# Patient Record
Sex: MALE | Race: OTHER | Employment: OTHER | URBAN - METROPOLITAN AREA
[De-identification: names, ages, dates, MRNs, and addresses within clinical notes are randomized per-mention and may not be internally consistent; named-entity substitution may affect disease eponyms.]

---

## 2021-12-10 ENCOUNTER — OFFICE VISIT (OUTPATIENT)
Dept: ORTHOPEDIC SURGERY | Age: 51
End: 2021-12-10
Payer: OTHER GOVERNMENT

## 2021-12-10 VITALS
BODY MASS INDEX: 40.02 KG/M2 | HEIGHT: 66 IN | TEMPERATURE: 97.8 F | OXYGEN SATURATION: 97 % | WEIGHT: 249 LBS | HEART RATE: 61 BPM

## 2021-12-10 DIAGNOSIS — M25.512 CHRONIC PAIN OF BOTH SHOULDERS: Primary | ICD-10-CM

## 2021-12-10 DIAGNOSIS — M75.122 NONTRAUMATIC COMPLETE TEAR OF LEFT ROTATOR CUFF: ICD-10-CM

## 2021-12-10 DIAGNOSIS — M25.511 CHRONIC PAIN OF BOTH SHOULDERS: Primary | ICD-10-CM

## 2021-12-10 DIAGNOSIS — G89.29 CHRONIC PAIN OF BOTH SHOULDERS: Primary | ICD-10-CM

## 2021-12-10 DIAGNOSIS — M75.111 NONTRAUMATIC INCOMPLETE TEAR OF RIGHT ROTATOR CUFF: ICD-10-CM

## 2021-12-10 PROCEDURE — 73030 X-RAY EXAM OF SHOULDER: CPT | Performed by: ORTHOPAEDIC SURGERY

## 2021-12-10 PROCEDURE — 99204 OFFICE O/P NEW MOD 45 MIN: CPT | Performed by: ORTHOPAEDIC SURGERY

## 2021-12-10 RX ORDER — ZOLPIDEM TARTRATE 10 MG/1
10 TABLET ORAL
COMMUNITY
Start: 2021-10-12

## 2021-12-10 RX ORDER — CITALOPRAM 10 MG/1
10 TABLET ORAL DAILY
COMMUNITY
Start: 2021-10-12 | End: 2022-03-23

## 2021-12-10 RX ORDER — OMEPRAZOLE 20 MG/1
20 TABLET, DELAYED RELEASE ORAL DAILY
COMMUNITY
Start: 2021-11-04 | End: 2022-03-23

## 2021-12-10 NOTE — PROGRESS NOTES
Patient: Naseem Pino                MRN: [id number]       SSN: [id number]  YOB: 1970        AGE: 45 y.o. SEX: male  Body mass index is 40.19 kg/m². PCP: Unknown, Provider, MD  12/10/21    CHIEF COMPLAINT: Bilateral shoulder pain 10/10 at times    HPI: Naseem Pino is a 45 y.o. male patient who presents to the office today with several years, up to 5, of bilateral shoulder pain. The left is worse than the right. He denies any specific injury or trauma in the past.  He states that his pain has been worsening over the past several years. He has pain with use of the arms especially away from the body and over shoulder height. He also has pain at night. In the past he has had physical therapy without resolution of symptoms. He did have MRIs in 2019 of both shoulders but nothing more recently. History reviewed. No pertinent past medical history. No family history on file. Current Outpatient Medications   Medication Sig Dispense Refill    omeprazole (PRILOSEC OTC) 20 mg tablet Take 20 mg by mouth daily.  citalopram (CELEXA) 10 mg tablet Take 10 mg by mouth daily.  zolpidem (AMBIEN) 10 mg tablet Take 10 mg by mouth.          No Known Allergies    Past Surgical History:   Procedure Laterality Date    HX ACL RECONSTRUCTION      HX HERNIA REPAIR      HX KNEE ARTHROSCOPY Right     HX ORCHIECTOMY  2006       Social History     Socioeconomic History    Marital status: UNKNOWN     Spouse name: Not on file    Number of children: Not on file    Years of education: Not on file    Highest education level: Not on file   Occupational History    Not on file   Tobacco Use    Smoking status: Never Smoker    Smokeless tobacco: Never Used   Substance and Sexual Activity    Alcohol use: Not on file    Drug use: Not on file    Sexual activity: Not on file   Other Topics Concern    Not on file   Social History Narrative    Not on file     Social Determinants of Health Financial Resource Strain:     Difficulty of Paying Living Expenses: Not on file   Food Insecurity:     Worried About Running Out of Food in the Last Year: Not on file    Kerline of Food in the Last Year: Not on file   Transportation Needs:     Lack of Transportation (Medical): Not on file    Lack of Transportation (Non-Medical): Not on file   Physical Activity:     Days of Exercise per Week: Not on file    Minutes of Exercise per Session: Not on file   Stress:     Feeling of Stress : Not on file   Social Connections:     Frequency of Communication with Friends and Family: Not on file    Frequency of Social Gatherings with Friends and Family: Not on file    Attends Bahai Services: Not on file    Active Member of [address] [place] [address] or Organizations: Not on file    Attends Club or Organization Meetings: Not on file    Marital Status: Not on file   Intimate Partner Violence:     Fear of Current or Ex-Partner: Not on file    Emotionally Abused: Not on file    Physically Abused: Not on file    Sexually Abused: Not on file   Housing Stability:     Unable to Pay for Housing in the Last Year: Not on file    Number of [place] in the Last Year: Not on file    Unstable Housing in the Last Year: Not on file       REVIEW OF SYSTEMS:      CON: negative for recent weight loss/gain, fever, or chills  EYE: negative for double or blurry vision  ENT: negative for hoarseness  RS:   negative for cough, URI, SOB  CV:  negative for chest pain, palpitations  GI:    negative for blood in stool, nausea/vomiting  :  negative for blood in urine  MS: As per HPI  Other systems reviewed and noted below. PHYSICAL EXAMINATION:  Visit Vitals  Pulse 61   Temp 97.8 °F (36.6 °C) (Temporal)   Ht 5' 6\" (1.676 m)   Wt 249 lb (112.9 kg)   SpO2 97%   BMI 40.19 kg/m²     Body mass index is 40.19 kg/m². GENERAL: Alert and oriented x3, in no acute distress, well-developed, well-nourished. HEENT: Normocephalic, atraumatic.     RESP: Non labored breathing with equal chest rise on inspiration. CV: Well perfused extremities. No cyanosis or clubbing noted. ABDOMEN: Soft, non-tender, non-distended. Shoulder Examination     R   L  ROM   FF  Full   Full  ER  Full   Full   IR  Full   Full  Rotator Cuff Pain   Supra  +   +   Infra  -   +   Subscap +   +  Crepitus  -   -  Effusion  -   -  Warmth  -   -   Erythema  -   -  Instability  -   -  AC Joint TTP  -   -  Clavicle   Deformity -   -   TTP  -   -  Proximal Humerus   Deformity -   -   TTP  -   -  Deltoid Strength 5   5  Biceps Strength 5   5  Biceps Deformity -   -  Biceps Groove Pain -   +  Impingement Sign -   -       IMAGING:  X-rays 4 views of the right shoulder were taken the office today which show a slightly elevated humeral head but no other significant shoulder findings. X-rays 4 views of left shoulder which also taken the office today which show sclerosis of the greater tuberosity but no other acute or chronic bony abnormality findings. An MRI of the left shoulder from 2019 was reviewed which shows a high-grade partial-thickness articular sided tear of the supraspinatus rotator cuff tendon with possible full-thickness component. An MRI of the right shoulder was reviewed from 2019 which does not show any full-thickness rotator cuff tearing. ASSESSMENT & PLAN  Diagnosis: Bilateral rotator cuff pain    Jessica Alegria has bilateral rotator cuff pain that is symptomatic and has been present for almost up to 5 years. Due to the chronicity of his symptoms as well as the failure of conservative management along with the MRI of his left shoulder in 2019 that showed a possible full-thickness rotator cuff tear with the risk of progression at approximately 50% I recommended new MRIs of both shoulders today to further evaluate the rotator cuff as I think that is likely the source of his pain. I will see him back after the MRIs are completed.       Electronically signed by: Candace Sylvester MD    Note: This note was completed using voice recognition software.   Any typographical/name errors or mistakes are unintentional.

## 2022-01-02 ENCOUNTER — HOSPITAL ENCOUNTER (OUTPATIENT)
Age: 52
Discharge: HOME | End: 2022-01-02
Attending: ORTHOPAEDIC SURGERY
Payer: OTHER GOVERNMENT

## 2022-01-02 DIAGNOSIS — M75.122 NONTRAUMATIC COMPLETE TEAR OF LEFT ROTATOR CUFF: ICD-10-CM

## 2022-01-02 DIAGNOSIS — M75.111 NONTRAUMATIC INCOMPLETE TEAR OF RIGHT ROTATOR CUFF: ICD-10-CM

## 2022-01-02 PROCEDURE — 73221 MRI JOINT UPR EXTREM W/O DYE: CPT

## 2022-01-05 ENCOUNTER — OFFICE VISIT (OUTPATIENT)
Dept: ORTHOPEDIC SURGERY | Age: 52
End: 2022-01-05
Payer: OTHER GOVERNMENT

## 2022-01-05 VITALS — BODY MASS INDEX: 40.19 KG/M2 | HEIGHT: 66 IN | TEMPERATURE: 97.6 F | RESPIRATION RATE: 16 BRPM

## 2022-01-05 DIAGNOSIS — M75.122 NONTRAUMATIC COMPLETE TEAR OF LEFT ROTATOR CUFF: Primary | ICD-10-CM

## 2022-01-05 DIAGNOSIS — M75.111 NONTRAUMATIC INCOMPLETE TEAR OF RIGHT ROTATOR CUFF: ICD-10-CM

## 2022-01-05 DIAGNOSIS — S46.102A INJURY OF TENDON OF LONG HEAD OF BICEPS, LEFT, INITIAL ENCOUNTER: ICD-10-CM

## 2022-01-05 PROCEDURE — 99214 OFFICE O/P EST MOD 30 MIN: CPT | Performed by: ORTHOPAEDIC SURGERY

## 2022-01-05 NOTE — PROGRESS NOTES
Patient: Jesse Burnham                MRN: [id number]       SSN: [id number]  YOB: 1970        AGE: 45 y.o. SEX: male  Body mass index is 40.19 kg/m². PCP: Martínez Hernandez MD  01/05/22    Chief Complaint: Bilateral shoulder follow up    Pain 7/10    HPI: Jesse Burnham is a 45 y.o. male patient who returns to the office today for his bilateral shoulders. He continues to have left shoulder pain as well as right shoulder pain and popping. The left shoulder pain is worse. He has had MRIs of both the left and right shoulder. History reviewed. No pertinent past medical history. History reviewed. No pertinent family history. Current Outpatient Medications   Medication Sig Dispense Refill    omeprazole (PRILOSEC OTC) 20 mg tablet Take 20 mg by mouth daily.  citalopram (CELEXA) 10 mg tablet Take 10 mg by mouth daily.  zolpidem (AMBIEN) 10 mg tablet Take 10 mg by mouth.          No Known Allergies    Past Surgical History:   Procedure Laterality Date    HX ACL RECONSTRUCTION      HX HERNIA REPAIR      HX KNEE ARTHROSCOPY Right     HX ORCHIECTOMY  2006       Social History     Socioeconomic History    Marital status:      Spouse name: Not on file    Number of children: Not on file    Years of education: Not on file    Highest education level: Not on file   Occupational History    Not on file   Tobacco Use    Smoking status: Never Smoker    Smokeless tobacco: Never Used   Substance and Sexual Activity    Alcohol use: Not on file    Drug use: Not on file    Sexual activity: Not on file   Other Topics Concern    Not on file   Social History Narrative    Not on file     Social Determinants of Health     Financial Resource Strain:     Difficulty of Paying Living Expenses: Not on file   Food Insecurity:     Worried About Running Out of Food in the Last Year: Not on file    Kerline of Food in the Last Year: Not on file   Transportation Needs:     Lack of Transportation (Medical): Not on file    Lack of Transportation (Non-Medical): Not on file   Physical Activity:     Days of Exercise per Week: Not on file    Minutes of Exercise per Session: Not on file   Stress:     Feeling of Stress : Not on file   Social Connections:     Frequency of Communication with Friends and Family: Not on file    Frequency of Social Gatherings with Friends and Family: Not on file    Attends Nondenominational Services: Not on file    Active Member of [address] [place] [address] or Organizations: Not on file    Attends Club or Organization Meetings: Not on file    Marital Status: Not on file   Intimate Partner Violence:     Fear of Current or Ex-Partner: Not on file    Emotionally Abused: Not on file    Physically Abused: Not on file    Sexually Abused: Not on file   Housing Stability:     Unable to Pay for Housing in the Last Year: Not on file    Number of [place] in the Last Year: Not on file    Unstable Housing in the Last Year: Not on file       REVIEW OF SYSTEMS:      No changes from previous review of systems unless noted. PHYSICAL EXAMINATION:  Visit Vitals  Temp 97.6 °F (36.4 °C) (Temporal)   Resp 16   Ht 5' 6\" (1.676 m)   BMI 40.19 kg/m²     Body mass index is 40.19 kg/m². GENERAL: Alert and oriented x3, in no acute distress. HEENT: Normocephalic, atraumatic. RESP: Non labored breathing. SKIN: No rashes or lesions noted. Shoulder Examination     R   L  ROM   FF  Full   Full  ER  Full   Full   IR  Full   Full  Rotator Cuff Pain   Supra  +   +   Infra  -   +   Subscap -   -  Crepitus  -   -  Effusion  -   -  Warmth  -   -   Erythema  -   -  Instability  -   -  AC Joint TTP  -   -  Clavicle   Deformity -   -   TTP  -   -  Proximal Humerus   Deformity -   -   TTP  -   -  Deltoid Strength 5   5  Biceps Strength 5   5  Biceps Deformity -   -  Biceps Groove Pain -   -  Impingement Sign -   -       IMAGING:  An MRI of the right shoulder is reviewed in the office today.   The MRI does not show any full-thickness rotator cuff tears. An MRI of the left shoulder was reviewed which shows a left supraspinatus full-thickness minimally retracted rotator cuff tear. ASSESSMENT & PLAN  Diagnosis: Right shoulder rotator cuff pain, left shoulder full-thickness rotator cuff tear    I recommended for Roby's left shoulder that he undergo surgery in the form of arthroscopic rotator cuff repair after discussing the risk benefits and alternatives to surgery. 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 For his right shoulder I recommended physical therapy at this time but we will hold off on that and likely get him started in both shoulder physical therapy postoperatively. Electronically signed by: Abad Carey MD    Note: This note was completed using voice recognition software.   Any typographical/name errors or mistakes are unintentional.

## 2022-03-09 DIAGNOSIS — S46.102A INJURY OF TENDON OF LONG HEAD OF BICEPS, LEFT, INITIAL ENCOUNTER: ICD-10-CM

## 2022-03-09 DIAGNOSIS — M75.122 NONTRAUMATIC COMPLETE TEAR OF LEFT ROTATOR CUFF: Primary | ICD-10-CM

## 2022-03-25 ENCOUNTER — OFFICE VISIT (OUTPATIENT)
Dept: ORTHOPEDIC SURGERY | Age: 52
End: 2022-03-25
Payer: OTHER GOVERNMENT

## 2022-03-25 VITALS
WEIGHT: 253 LBS | TEMPERATURE: 97.1 F | OXYGEN SATURATION: 94 % | SYSTOLIC BLOOD PRESSURE: 125 MMHG | HEIGHT: 66 IN | RESPIRATION RATE: 16 BRPM | DIASTOLIC BLOOD PRESSURE: 80 MMHG | BODY MASS INDEX: 40.66 KG/M2 | HEART RATE: 90 BPM

## 2022-03-25 DIAGNOSIS — R05.9 COUGH IN ADULT PATIENT: Primary | ICD-10-CM

## 2022-03-25 PROCEDURE — 99212 OFFICE O/P EST SF 10 MIN: CPT | Performed by: PHYSICIAN ASSISTANT

## 2022-03-25 NOTE — PROGRESS NOTES
Angie Puri returns to the office for history and physical in preparation for his left shoulder rotator cuff repair. Last evening he developed a fever with cough and body aches. He did have Covid back in January 2022. He did perform a Covid test which was negative by report last evening. He is scheduled for surgery on Tuesday upcoming for the left shoulder arthroscopic rotator cuff repair with biceps tenodesis. At this point based on his presentation he is good to go home today get rest increase fluids and if feeling poorly tomorrow Saturday the 26th be seen by [facility] for proper medical assessment and diagnosis. Should he develop fever over 100.5 he should treat accordingly with Tylenol and/or Motrin per 's recommended dosing. Any chest pain or shortness of breath he was recommended to the emergency room. He will phone our office on Monday, 28 March to discuss plan of care. He is tentatively scheduled still for surgery on 29 March 2022.

## 2022-05-10 ENCOUNTER — OFFICE VISIT (OUTPATIENT)
Dept: ORTHOPEDIC SURGERY | Age: 52
End: 2022-05-10

## 2022-05-10 VITALS
WEIGHT: 246.8 LBS | HEART RATE: 81 BPM | TEMPERATURE: 97.8 F | DIASTOLIC BLOOD PRESSURE: 74 MMHG | HEIGHT: 66 IN | BODY MASS INDEX: 39.66 KG/M2 | SYSTOLIC BLOOD PRESSURE: 114 MMHG | OXYGEN SATURATION: 97 %

## 2022-05-10 DIAGNOSIS — S46.102A INJURY OF TENDON OF LONG HEAD OF BICEPS, LEFT, INITIAL ENCOUNTER: ICD-10-CM

## 2022-05-10 DIAGNOSIS — M75.122 NONTRAUMATIC COMPLETE TEAR OF LEFT ROTATOR CUFF: Primary | ICD-10-CM

## 2022-05-10 DIAGNOSIS — G89.18 POST-OP PAIN: ICD-10-CM

## 2022-05-10 DIAGNOSIS — Z01.818 PRE-OP EXAM: ICD-10-CM

## 2022-05-10 RX ORDER — AMOXICILLIN 250 MG
1 CAPSULE ORAL DAILY
Qty: 30 TABLET | Refills: 0 | Status: SHIPPED | OUTPATIENT
Start: 2022-05-10

## 2022-05-10 RX ORDER — FLUTICASONE PROPIONATE AND SALMETEROL XINAFOATE 115; 21 UG/1; UG/1
2 AEROSOL, METERED RESPIRATORY (INHALATION) 2 TIMES DAILY
COMMUNITY
Start: 2022-04-21

## 2022-05-10 NOTE — PROGRESS NOTES
HISTORY AND PHYSICAL          Patient: Anmol Teixeira                MRN: [id number]       SSN: [id number]  YOB: 1970          AGE: 47 y.o. SEX: male      Patient scheduled for:  LEFT SHOULDER ARTHROSCOPIC ROTATOR CUFF REPAIR, POSSIBLE BICEPS TENODESIS    Surgeon: Collette Matar MD    ANESTHESIA TYPE:  General    HISTORY:     The patient was seen in the office today for a preoperative history and physical for an upcoming above listed surgery. The patient is a pleasant 47 y.o. male who has a history of bilateral shoulder pain. The left is worse than the right. He denies any specific injury or trauma in the past.  He states that his pain has been worsening over the past several years. He has pain with use of the arms especially away from the body and over shoulder height. He also has pain at night. In the past he has had physical therapy without resolution of symptoms. He did have MRIs in 2019 of both shoulders but nothing more recently. Due to the current findings, affected activity of daily living and continued pain and discomfort, surgical intervention is indicated. The alternatives, risks, and complications, including but not limited to infection, blood loss, need for blood transfusion, neurovascular damage, efrain-incisional numbness, subcutaneous hematoma, bone fracture, anesthetic complications, DVT, PE, death, RSD, postoperative stiffness and pain, possible surgical scar, delayed healing and nonhealing, reflexive sympathetic dystrophy, damage to blood vessels and nerves, need for more surgery, MI, and stroke,  failure of hardware, gait disturbances,have been discussed. The patient understands and wishes to proceed with surgery.      PAST MEDICAL HISTORY:     Past Medical History:   Diagnosis Date    COVID-19 01/2022    GERD (gastroesophageal reflux disease)     Sleep apnea     no cpap       CURRENT MEDICATIONS:     Current Outpatient Medications   Medication Sig Dispense Refill    senna-docusate (Colace 2-In-1) 8.6-50 mg per tablet Take 1 Tablet by mouth daily. Indications: constipation 30 Tablet 0    Advair -21 mcg/actuation inhaler Take 2 Puffs by inhalation two (2) times a day.  esomeprazole (NexIUM) 20 mg capsule Take 20 mg by mouth daily.  benzonatate (Tessalon Perles) 100 mg capsule Take 100 mg by mouth three (3) times daily as needed for Cough.  zolpidem (AMBIEN) 10 mg tablet Take 10 mg by mouth nightly as needed. ALLERGIES:     No Known Allergies      SURGICAL HISTORY:     Past Surgical History:   Procedure Laterality Date    HX ACL RECONSTRUCTION      HX HERNIA REPAIR      HX KNEE ARTHROSCOPY Right     HX ORCHIECTOMY  2006       SOCIAL HISTORY:     Social History     Socioeconomic History    Marital status:    Tobacco Use    Smoking status: Never Smoker    Smokeless tobacco: Never Used   Vaping Use    Vaping Use: Never used   Substance and Sexual Activity    Alcohol use: Yes     Comment: occasional    Drug use: Never       FAMILY HISTORY:     History reviewed. No pertinent family history. REVIEW OF SYSTEMS:     Negative for fevers, chills, chest pain, shortness of breath, weight loss, recent illness     General: Negative for fever and chills. No unexpected change in weight. Denies fatigue. No change in appetite. Skin: Negative for rash or itching. HEENT: Negative for congestion, sore throat, neck pain and neck stiffness. No change in vision or hearing. Hasn't noted any enlarged lymph nodes in the neck. Cardiovascular:  Negative for chest pain and palpitations. Has not noted pedal edema. Respiratory: Negative for cough, colds, sinus, hemoptysis, shortness of breath and wheezing. Gastrointestinal: Negative for nausea and vomiting, rectal bleeding, coffee ground emesis, abdominal pain, diarrhea and constipation. Genitourinary: Negative for dysuria, frequency urgency, or burning on micturition.  No flank pain, no foul smelling urine, no difficulty with initiating urination. Hematological: Negative for bleeding or easy bruising. Musculoskeletal: Negative  for arthralgias, back pain or neck pain. Neurological: Negative for dizziness, seizures or syncopal episodes. Denies headaches. Endocrine: Denies excessive thirst.  No heat/cold intolerance. Psychiatric: Negative for depression or insomnia. PHYSICAL EXAMINATION:     VITALS:   Visit Vitals  /74 (BP 1 Location: Left upper arm, BP Patient Position: Sitting, BP Cuff Size: Adult)   Pulse 81   Temp 97.8 °F (36.6 °C) (Temporal)   Ht 5' 6\" (1.676 m)   Wt 246 lb 12.8 oz (111.9 kg)   SpO2 97%   BMI 39.83 kg/m²     GEN:  Well developed, well nourished 47 y.o. male in no acute distress. HEENT: Normocephalic and atraumatic. Eyes: Conjunctivae and EOM are normal.Pupils are equal, round, and reactive to light. External ear normal appearance, external nose normal appearing. Mouth/Throat: Oropharynx is clear and moist, able to handle oral secretions w/out difficulty, airway patent  NECK: Supple. Normal ROM, No lymphadenopathy. Trachea is midline. No bruising, swelling or deformity  RESP: Clear to auscultation bilaterally. No wheezes, rales, rhonchi. Normal effort and breath sounds. No respiratory distress  CARDIO: Normal rate, regular rhythm and normal heart sounds. No MGR. ABDOMEN: Soft, non-tender, non-distended, normoactive bowel sounds in all four quadrants. There is no tenderness. There is no rebound and no guarding.    BACK: No CVA or spinal tenderness  BREAST:  Deferred  PELVIC:    Deferred   RECTAL:  Deferred   :           Deferred  EXTREMITIES: EXAMINATION OF: left shoulder  Shoulder Examination                                      R                                  L  ROM              FF                    Full                              Full  ER                   Full                              Full              IR                     Full Full  Rotator Cuff Pain              Supra               +                                  +              Infra                 -                                   +              Subscap          +                                  +  Crepitus                       -                                   -  Effusion                       -                                   -  Warmth                       -                                   -             Erythema                     -                                   -  Instability                     -                                   -  AC Joint TTP               -                                   -  Clavicle              Deformity         -                                   -              TTP                 -                                   -  Proximal Humerus              Deformity         -                                   -              TTP                 -                                   -  Deltoid Strength          5                                  5  Biceps Strength          5                                  5  Biceps Deformity         -                                   -  Biceps Groove Pain    -                                   +  Impingement Sign       -                                   -             NEUROVASCULAR: Sensation intact to light touch and strength grossly intact and symmetrical. No nystagmus. Positive distal pulses and capillary refill. DVT ASSESSMENT:  There is not  calf tenderness. No evidence of DVT seen on physical exam.  MOTOR: In tact  PSYCH: Alert an oriented to person, place and time.  Mood, memory, affect, behavior and judgment normal       RADIOGRAPHS & DIAGNOSTIC STUDIES:     MRI/xray reveals :       X-rays 4 views of left shoulder which also taken the office today which show sclerosis of the greater tuberosity but no other acute or chronic bony abnormality findings.     An MRI of the left shoulder from 2019 was reviewed which shows a high-grade partial-thickness articular sided tear of the supraspinatus rotator cuff tendon with possible full-thickness component. ASSESSMENT:       Encounter Diagnoses   Name Primary?  Nontraumatic complete tear of left rotator cuff Yes    Injury of tendon of long head of biceps, left, initial encounter     Pre-op exam     Post-op pain        PLAN:     Again, the alternatives, risks, and complications, as well as expected outcome were discussed. The patient understands and agrees to proceed with LEFT SHOULDER ARTHROSCOPIC ROTATOR CUFF REPAIR, POSSIBLE BICEPS TENODESIS.  Patient given orders listed below:    Orders Placed This Encounter    Advair -21 mcg/actuation inhaler    senna-docusate (Colace 2-In-1) 8.6-50 mg per tablet         China Boggs PA-C  5/10/2022  2:24 PM

## 2022-05-26 ENCOUNTER — TELEPHONE (OUTPATIENT)
Dept: ORTHOPEDIC SURGERY | Age: 52
End: 2022-05-26

## 2022-05-26 DIAGNOSIS — G89.18 ACUTE POST-OPERATIVE PAIN: Primary | ICD-10-CM

## 2022-05-26 RX ORDER — OXYCODONE AND ACETAMINOPHEN 5; 325 MG/1; MG/1
2 TABLET ORAL
Qty: 40 TABLET | Refills: 0 | Status: SHIPPED | OUTPATIENT
Start: 2022-05-26 | End: 2022-06-02

## 2022-05-26 NOTE — TELEPHONE ENCOUNTER
Patient just had surgery this morning. He is asking about the severity of the tear. Please call patient when possible.       Patient [phone]

## 2022-05-27 NOTE — TELEPHONE ENCOUNTER
Will discuss this in his first post op appt next week. I will have pictures at that time to show and better explain. The tear was small and we were able to repair it easily.

## 2022-06-01 NOTE — PROGRESS NOTES
Singh Conde  1970     HISTORY OF PRESENT ILLNESS  Singh Conde is a 47 y.o. male who presents today for evaluation s/p Left shoulder arthroscopic rotator cuff tear and biceps tenodesis on 5/26/22. Patient has not been going to PT. Describes pain as a 5/10. Has been taking roxicodone for pain. Still has night pain. He has been compliant with sling and restrictions. Patient denies any fever, chills, chest pain, shortness of breath or calf pain. There are no new illness or injuries to report since last seen in the office. PHYSICAL EXAM:   Visit Vitals  Pulse 78   Temp 98.7 °F (37.1 °C) (Temporal)   Resp 16   Ht 5' 6\" (1.676 m)   SpO2 96%   BMI 39.83 kg/m²      The patient is a well-developed, well-nourished male in no acute distress. The patient is alert and oriented times three. The patient appears to be well groomed. Mood and affect are normal.  ORTHOPEDIC EXAM of left shoulder:  Inspection: swelling present,  Bruising present  Incision, clean, dry, intact, sutures in place  Passive glenohumeral abduction 0-20 degrees in the sling otherwise not assessed  Stability: Stable  Strength: n/a  2+ distal pulses    IMPRESSION:  s/p Left shoulder arthroscopic rotator cuff tear and biceps tenodesis    PLAN:   Pt doing well post operatively  Incisions cleaned. Sutures removed and steri strips applied  Surgery was discussed at length today. Continue wearing sling until 2 post op. Will start PT and exercises today  Stressed to patient that nothing causes an increase in pain. Pt given a refill of pain medication today. 5 mg Roxicodone Patient given pain medication for short term acute pain relief. Goal is to treat patient according to above plan and to ultimately have patient off all pain medications once appropriate. If chronic pain management is required beyond what is expected for current orthopedic problem, will refer patient to pain management.   was reviewed and will be reviewed with every medication refill request.   RTC 3 weeks    ABRAHAM Dumont [phone]

## 2022-06-03 ENCOUNTER — OFFICE VISIT (OUTPATIENT)
Dept: ORTHOPEDIC SURGERY | Age: 52
End: 2022-06-03
Payer: OTHER GOVERNMENT

## 2022-06-03 VITALS
RESPIRATION RATE: 16 BRPM | BODY MASS INDEX: 39.83 KG/M2 | HEIGHT: 66 IN | TEMPERATURE: 98.7 F | OXYGEN SATURATION: 96 % | HEART RATE: 78 BPM

## 2022-06-03 DIAGNOSIS — G89.18 POST-OP PAIN: ICD-10-CM

## 2022-06-03 DIAGNOSIS — M75.122 NONTRAUMATIC COMPLETE TEAR OF LEFT ROTATOR CUFF: Primary | ICD-10-CM

## 2022-06-03 DIAGNOSIS — S46.102A INJURY OF TENDON OF LONG HEAD OF BICEPS, LEFT, INITIAL ENCOUNTER: ICD-10-CM

## 2022-06-03 DIAGNOSIS — Z98.890 STATUS POST ARTHROSCOPY OF LEFT SHOULDER: ICD-10-CM

## 2022-06-03 PROCEDURE — 99024 POSTOP FOLLOW-UP VISIT: CPT | Performed by: ORTHOPAEDIC SURGERY

## 2022-06-03 RX ORDER — OXYCODONE HYDROCHLORIDE 5 MG/1
5 TABLET ORAL
Qty: 28 TABLET | Refills: 0 | Status: SHIPPED | OUTPATIENT
Start: 2022-06-03 | End: 2022-06-10

## 2022-06-13 ENCOUNTER — TELEPHONE (OUTPATIENT)
Dept: ORTHOPEDIC SURGERY | Age: 52
End: 2022-06-13

## 2022-06-13 NOTE — TELEPHONE ENCOUNTER
Yes it is ok to D/C sling, it is also ok to walk around the neighborhood. No active movement with his operative arm. Please have him work on his exercises given in the office at home.

## 2022-06-13 NOTE — TELEPHONE ENCOUNTER
Patient advised Yes it is ok to D/C sling, it is also ok to walk around the neighborhood. No active movement with his operative arm. Please have him work on his exercises given in the office at home.

## 2022-06-13 NOTE — TELEPHONE ENCOUNTER
Patient wants to verify if he can stop wearing the sling after 2 weeks and his paperwork says 4 weeks. He reports he has a wicked heat rash on the forearm from the sling and he is questioning if he can discontinue wearing this. He wants to verify when he can begin the home exercises. He is asking if it is ok to walk around the neighborhood and just move his shoulder some while doing so. Please return his call.      Patient [phone]

## 2022-06-16 ENCOUNTER — TELEPHONE (OUTPATIENT)
Dept: ORTHOPEDIC SURGERY | Age: 52
End: 2022-06-16

## 2022-06-16 DIAGNOSIS — G89.18 POST-OP PAIN: Primary | ICD-10-CM

## 2022-06-16 RX ORDER — DICLOFENAC SODIUM 75 MG/1
75 TABLET, DELAYED RELEASE ORAL 2 TIMES DAILY WITH MEALS
Qty: 60 TABLET | Refills: 3 | Status: SHIPPED | OUTPATIENT
Start: 2022-06-16 | End: 2022-07-20

## 2022-06-16 NOTE — TELEPHONE ENCOUNTER
Patient called and stated he had shoulder surgery on 05/26/2022. He is requesting for Dr. Santos Craven to send a prescription in for his shoulder pain. He took his last pill last night and can barely sleep, he stated he was up all night. He also stated that he contacted the physical therapist and told him he was doing home exercises as he was told after surgery but the therapist told him to stop doing the arm exercises at home and to wait until he starts physical therapy on 06/20/2022 and to keep the sling on at all times.

## 2022-06-16 NOTE — TELEPHONE ENCOUNTER
rx for diclofenac sent the pharmacy. We can not do anything stronger. He is over a month out from surgery. Recommend tylenol, ice/heat whichever is better, OTC creams and rest to help with pain.

## 2022-06-17 NOTE — TELEPHONE ENCOUNTER
Patient called again for status on this reconsideration request.  Advised by me lead to resend to the pool.   Please advise patient if we can accommodate this request.     Patient [phone]

## 2022-06-17 NOTE — PROGRESS NOTES
Charmayne Dallas  1970     HISTORY OF PRESENT ILLNESS  Charmayne Dallas is a 47 y.o. male who presents today for evaluation s/p Left shoulder arthroscopic rotator cuff tear and biceps tenodesis on 5/26/22. Patient has not been going to PT. Describes pain as a 6/10. Has been taking roxicodone for pain. Still has night pain. He has been compliant with sling and restrictions. He was told by PT to wear his sling and not start his exercises until yesterday when he saw them. He also has a heat rash from the sling on his arm. Patient denies any fever, chills, chest pain, shortness of breath or calf pain. There are no new illness or injuries to report since last seen in the office. PHYSICAL EXAM:   Visit Vitals  Pulse 70   Temp 97.5 °F (36.4 °C) (Temporal)   Ht 5' 6\" (1.676 m)   Wt 246 lb (111.6 kg)   SpO2 97%   BMI 39.71 kg/m²      The patient is a well-developed, well-nourished male in no acute distress. The patient is alert and oriented times three. The patient appears to be well groomed. Mood and affect are normal.  ORTHOPEDIC EXAM of left shoulder:  Inspection: swelling not present,  Bruising not present  Incisions well healed  Passive glenohumeral abduction 0-50 degrees, 40 PFF, 10 PER  Stability: Stable  Strength: n/a  2+ distal pulses    IMPRESSION:  s/p Left shoulder arthroscopic rotator cuff tear and biceps tenodesis    PLAN:   Pt doing well post operatively  He is stiff on exam today. I would have rather him start the exercises at 2 weeks post op. He will D/C sling now and start his exercises. He will also continue PT. Continue wearing sling until 2 post op. Will start PT and exercises today  He will try OTC creams for the rash and hopefully D/C the sling will help resolve this. He will contact his PCP if it continues. Stressed to patient that nothing causes an increase in pain. Pt given a refill of pain medication today. 5 mg Roxicodone Patient given pain medication for short term acute pain relief. Goal is to treat patient according to above plan and to ultimately have patient off all pain medications once appropriate. If chronic pain management is required beyond what is expected for current orthopedic problem, will refer patient to pain management.   was reviewed and will be reviewed with every medication refill request.   RTC 4 weeks    ABRAHAM Neumann [facility]

## 2022-06-17 NOTE — TELEPHONE ENCOUNTER
Patient did  the Diclofenac and took it yesterday and today. He states his surgery was on 05/26/22, so not quite a month out yet. He acknowledges the concern for pain medication, but he states he really just needs something he can take at night because he wakes around 1 am in quite a bit of pain and the Diclofenac is not diminishing this pain. He is requesting we reconsider. Please advise.      Patient [phone]

## 2022-06-20 ENCOUNTER — HOSPITAL ENCOUNTER (OUTPATIENT)
Dept: PHYSICAL THERAPY | Age: 52
Discharge: HOME | End: 2022-06-20
Payer: OTHER GOVERNMENT

## 2022-06-20 DIAGNOSIS — Z98.890 STATUS POST ARTHROSCOPY OF LEFT SHOULDER: ICD-10-CM

## 2022-06-20 DIAGNOSIS — S46.102A INJURY OF TENDON OF LONG HEAD OF BICEPS, LEFT, INITIAL ENCOUNTER: ICD-10-CM

## 2022-06-20 DIAGNOSIS — M75.122 NONTRAUMATIC COMPLETE TEAR OF LEFT ROTATOR CUFF: ICD-10-CM

## 2022-06-20 PROCEDURE — 97110 THERAPEUTIC EXERCISES: CPT

## 2022-06-20 PROCEDURE — 97162 PT EVAL MOD COMPLEX 30 MIN: CPT

## 2022-06-20 RX ORDER — OXYCODONE HYDROCHLORIDE 5 MG/1
5 TABLET ORAL
Qty: 40 TABLET | Refills: 0 | Status: SHIPPED | OUTPATIENT
Start: 2022-06-20 | End: 2022-06-30

## 2022-06-20 NOTE — PROGRESS NOTES
PHYSICAL THERAPY - DAILY TREATMENT NOTE    Patient Name: Ruth Gallagher        Date: 2022  : 1970   YES Patient  Verified  Visit #:      of   12  Insurance: Payor: [facility] CCN / Plan: Mavis Endo / Product Type: Federal Funded Programs /      In time: 350 Out time: 440   Total Treatment Time: 50     BCBS/Medicare Time Tracking (below)   Total Timed Codes (min):   1:1 Treatment Time:       TREATMENT AREA =  Nontraumatic complete tear of left rotator cuff [M75.122]  Injury of tendon of long head of biceps, left, initial encounter [S46.102A]  Status post arthroscopy of left shoulder [Z98.890]    SUBJECTIVE  Pain Level (on 0 to 10 scale):  6  / 10   Medication Changes/New allergies or changes in medical history, any new surgeries or procedures? NO    If yes, update Summary List   Subjective Functional Status/Changes:  []  No changes reported   The patient reports gradual onset of L shoulder pain beginning 6 years ago. Prior to surgery he reports full L shoulder AROM but constant pain, sleep distrubances and weakness. He is RHD. Compliance with immobilization while wearing abduction sling has been fair per patient report. He was frequently reminded throughout appt to avoid active movement of L UE, particularly while wearing sling. He reports significant pain along upper arm/ant shoulder and difficulty finding position of comfort while sleeping. Positioning and importance of activity/movement restriction to allow for adequate healing has been reviewed at length.        Modalities Rationale:     decrease inflammation, decrease pain and increase tissue extensibility to improve patient's ability to perform ADLs   min [] Estim, type/location:                                     []  att     []  unatt     []  w/US     []  w/ice    []  w/heat    min []  Mechanical Traction: type/lbs                   []  pro   []  sup   []  int   []  cont    []  before manual    []  after manual    min [] Ultrasound, settings/location:      min []  Iontophoresis w/ dexamethasone, location:                                               []  take home patch       []  in clinic    min []  Ice     []  Heat    location/position:     min []  Vasopneumatic Device, press/temp: If using vaso (only need to measure limb vaso being performed on)      pre-treatment girth :       post-treatment girth :       measured at (landmark location) :      min []  Other:    [x] Skin assessment post-treatment (if applicable):    [x]  intact    [x]  redness- no adverse reaction     []redness - adverse reaction:        10 min Therapeutic Exercise:  [x]  See flow sheet   Rationale:      increase ROM and increase strength to improve the patients ability to perform unlimited ADLs      min Manual Therapy: Technique:      [] S/DTM []IASTM []PROM  [] Passive Stretching  []manual TPR  []Jt manipulation:Gr I [] II []  III [] IV[] V[]  Treatment/Area:     Rationale:      decrease pain, increase ROM, increase tissue extensibility and decrease trigger points to improve patient's ability to perform ADLs  The manual therapy interventions were performed at a separate and distinct time from the therapeutic activities interventions.      min Therapeutic Activity: [x]  See flow sheet   Rationale:    increase ROM, increase strength, and improve coordination to improve the patients mechanics with push, pull, carry and lift     min Neuromuscular Re-ed: [x]  See flow sheet   Rationale:    improve coordination, improve balance, and increase proprioception to improve the patients postural awareness, stability and motor control    Billed With/As:   [x] TE   [] TA   [] Neuro   [] Self Care Patient Education: [x] Review HEP    [] Progressed/Changed HEP based on:   [x] positioning   [x] body mechanics   [] transfers   [x] heat/ice application    [x] other: Issued written HEP and reviewed     Other Objective/Functional Measures:    PROM: L shoulder flex 90°, [email or web address] 20° significant pain and mm guarding   Post Treatment Pain Level (on 0 to 10) scale:   6  / 10     ASSESSMENT  Assessment/Changes in Function:     See POC     []  See Progress Note/Recertification   Patient will continue to benefit from skilled PT services to modify and progress therapeutic interventions, address functional mobility deficits, address ROM deficits, address strength deficits, analyze and address soft tissue restrictions, analyze and cue movement patterns, analyze and modify body mechanics/ergonomics, assess and modify postural abnormalities, address imbalance/dizziness and instruct in home and community integration to attain remaining goals.    Progress toward goals / Updated goals:    Progressing towards newly established goals in [address] [facility] [address]  [x]  Upgrade activities as tolerated YES Continue plan of care   []  Discharge due to :    []  Other:      Therapist: Sugar Bae, PT, DPT, MTC, CMTPT    Date: 6/20/2022 Time: 4:03 PM     Future Appointments   Date Time Provider Parker Del Angel   6/21/2022  8:30 AM Lacey Anna, PA [facility]

## 2022-06-21 ENCOUNTER — OFFICE VISIT (OUTPATIENT)
Dept: ORTHOPEDIC SURGERY | Age: 52
End: 2022-06-21
Payer: OTHER GOVERNMENT

## 2022-06-21 VITALS
HEIGHT: 66 IN | OXYGEN SATURATION: 97 % | TEMPERATURE: 97.5 F | WEIGHT: 246 LBS | HEART RATE: 70 BPM | BODY MASS INDEX: 39.53 KG/M2

## 2022-06-21 DIAGNOSIS — M75.122 NONTRAUMATIC COMPLETE TEAR OF LEFT ROTATOR CUFF: Primary | ICD-10-CM

## 2022-06-21 DIAGNOSIS — S46.102A INJURY OF TENDON OF LONG HEAD OF BICEPS, LEFT, INITIAL ENCOUNTER: ICD-10-CM

## 2022-06-21 DIAGNOSIS — Z98.890 STATUS POST ARTHROSCOPY OF LEFT SHOULDER: ICD-10-CM

## 2022-06-21 PROCEDURE — 99024 POSTOP FOLLOW-UP VISIT: CPT | Performed by: ORTHOPAEDIC SURGERY

## 2022-06-21 NOTE — THERAPY EVALUATION
[facility] PHYSICAL THERAPY AT [facility] [place] [address], [address] [address], [address]  Phone: [phone]  Fax: [phone] / [address] [address] PHYSICAL THERAPY SERVICES  Patient Name: Randy Barron : 1970   Medical   Diagnosis: Nontraumatic complete tear of left rotator cuff [M75.122]  Injury of tendon of long head of biceps, left, initial encounter [S46.102A]  Status post arthroscopy of left shoulder [Z98.890] Treatment Diagnosis: L shoulder pain   Onset Date: 22     Referral Source: Dusty Sharma, AMBER Start of Care [facility]): 2022   Prior Hospitalization: See medical history Provider #: [id number]   Prior Level of Function: Manageable SX in L shoulder with ADLs, undisturbed sleeping   Comorbidities: BMI > 30, back pain   Medications: Verified on Patient Summary List   The Plan of Care and following information is based on the information from the initial evaluation.   ===========================================================================================  Assessment / key information: Patient is a 47 y.o. male who presents to [facility] at [facility] S/P L RCR and biceps tenodesis on 22. The patient reports gradual onset of L shoulder pain beginning 6 years ago. Prior to surgery he reports full L shoulder AROM but constant pain, sleep distrubances and weakness. He is RHD. Compliance with immobilization while wearing abduction sling has been fair per patient report. He was frequently reminded throughout appt to avoid active movement of L UE, particularly while wearing sling. He reports significant pain along upper arm/ant shoulder and difficulty finding position of comfort while sleeping. Positioning and importance of activity/movement restriction to allow for adequate healing has been reviewed at length.     Objective PT examination findings include:  PROM: L shoulder flex 90°, [email or web address] 20° significant pain and mm guarding    A home exercise program was demonstrated and provided to address the above objective and functional deficits. Patient can benefit from skilled PT interventions to improve ROM, decrease pain, to facilitate performance of ADLs & improve overall functional status.   ===========================================================================================  Eval Complexity: History MEDIUM  Complexity : 1-2 comorbidities / personal factors will impact the outcome/ POC ;  Examination  MEDIUM Complexity : 3 Standardized tests and measures addressing body structure, function, activity limitation and / or participation in recreation ; Presentation MEDIUM Complexity : Evolving with changing characteristics ; Decision Making MEDIUM Complexity : FOTO score of 26-74; Overall Complexity MEDIUM  Problem List: pain affecting function, decrease ROM, decrease strength, decrease ADL/ functional abilitiies, decrease activity tolerance and decrease flexibility/ joint mobility, FOTO score 55  Treatment Plan may include any combination of the following: Therapeutic exercise, Therapeutic activities, Neuromuscular re-education, Physical agent/modality, Gait/balance training, Manual therapy including dry needling, Aquatic therapy and Patient education  Patient / Family readiness to learn indicated by: asking questions, trying to perform skills and interest  Persons(s) to be included in education: patient (P)  Barriers to Learning/Limitations: no  Measures taken:    Patient Goal (s): \"Restore ROM; Facilitate healing; get advice from Therapist\"   Patient self reported health status: fair  Rehabilitation Potential: good   Short Term Goals: To be accomplished in  1-2  weeks:  1) Patient to be independent and compliant with initial HEP to prevent further disability. 2) Improve L shoulder PROM to [facility] so ROM is available for transition out of sling to AAROM>AROM.   3) Patient will report decreased c/o pain to < or = 2/10 to facilitate ease with sleeping with manageable sx in L shoulder using HEP and positioning techniques.  Long Term Goals: To be accomplished in  6-8  weeks:  1) Patient to be independent & compliant with a progressive, high level HEP in order to maintain gains made in physical therapy. 2) Improve FOTO score to 75 indicating significant functional improvement in order to return to PLOF to lift, carry and reach. 3) Improve L shoulder AROM to [facility] so ROM is available for [facility] reaching and dressing. Frequency / Duration:   Patient to be seen  2-3  times per week for 3-4  weeks:  Patient / Caregiver education and instruction: self care, activity modification and exercises    Therapist Signature: Tiera Thakkar PT, DPT, MTC, CMTPT Date: 6/21/7088   Certification Period: NA Time: 10:08 PM   ===========================================================================================  I certify that the above Physical Therapy Services are being furnished while the patient is under my care. I agree with the treatment plan and certify that this therapy is necessary. Physician Signature:        Date:       Time:              Kaley Kearns NP  Please sign and return to [facility] or you may fax the signed copy to [phone]. Thank you.

## 2022-06-22 ENCOUNTER — HOSPITAL ENCOUNTER (OUTPATIENT)
Dept: PHYSICAL THERAPY | Age: 52
Discharge: HOME | End: 2022-06-22
Payer: OTHER GOVERNMENT

## 2022-06-22 PROCEDURE — 97140 MANUAL THERAPY 1/> REGIONS: CPT

## 2022-06-22 PROCEDURE — 97110 THERAPEUTIC EXERCISES: CPT

## 2022-06-22 PROCEDURE — 97530 THERAPEUTIC ACTIVITIES: CPT

## 2022-06-22 NOTE — PROGRESS NOTES
PHYSICAL THERAPY - DAILY TREATMENT NOTE    Patient Name: Hieu Schmidt        Date: 2022  : 1970   YES Patient  Verified  Visit #:   2   of   12  Insurance: Payor: [facility] CCN / Plan: Cristofer Huffman / Product Type: Federal Funded Programs /      In time: 306 Out time: 440   Total Treatment Time: 50     BCBS/Medicare Time Tracking (below)   Total Timed Codes (min):   1:1 Treatment Time:       TREATMENT AREA =  Pain in left shoulder [M25.512]    SUBJECTIVE  Pain Level (on 0 to 10 scale):  8  / 10   Medication Changes/New allergies or changes in medical history, any new surgeries or procedures? NO    If yes, update Summary List   Subjective Functional Status/Changes:  []  No changes reported     The pain during the day has been a little less since [place] been trying to not my my arm/hand as much. Still having a lot of pain at night.  I tried sleeping in bed but couldn't and went back to the recliner       Modalities Rationale:     decrease inflammation, decrease pain and increase tissue extensibility to improve patient's ability to perform ADLs   min [] Estim, type/location:                                     []  att     []  unatt     []  w/US     []  w/ice    []  w/heat    min []  Mechanical Traction: type/lbs                   []  pro   []  sup   []  int   []  cont    []  before manual    []  after manual    min []  Ultrasound, settings/location:      min []  Iontophoresis w/ dexamethasone, location:                                               []  take home patch       []  in clinic   10 min [x]  Ice     []  Heat    location/position: L shoulder in supine    min []  Vasopneumatic Device, press/temp: If using vaso (only need to measure limb vaso being performed on)      pre-treatment girth :       post-treatment girth :       measured at (landmark location) :      min []  Other:    [x] Skin assessment post-treatment (if applicable):    [x]  intact    [x]  redness- no adverse reaction     []redness - adverse reaction:        15 min Therapeutic Exercise:  [x]  See flow sheet   Rationale:      increase ROM and increase strength to improve the patients ability to perform unlimited ADLs     15 min Manual Therapy: Technique:      [] S/DTM []IASTM [x]PROM  [] Passive Stretching  []manual TPR  [x]Jt manipulation:Gr I [x] II [x]  III [] IV[] V[]  Treatment/Area:  L shoulder PROM in all planes, gentle oscillations at end range    Rationale:      decrease pain, increase ROM, increase tissue extensibility and decrease trigger points to improve patient's ability to perform ADLs  The manual therapy interventions were performed at a separate and distinct time from the therapeutic activities interventions. 10 min Therapeutic Activity: [x]  See flow sheet, positioning while sleeping, sitting, driving; dressing   Rationale:    increase ROM, increase strength, and improve coordination to improve the patients mechanics with push, pull, carry and lift     min Neuromuscular Re-ed: [x]  See flow sheet   Rationale:    improve coordination, improve balance, and increase proprioception to improve the patients postural awareness, stability and motor control    Billed With/As:   [x] TE   [x] TA   [] Neuro   [] Self Care Patient Education: [x] Review HEP    [] Progressed/Changed HEP based on:   [x] positioning   [x] body mechanics   [] transfers   [x] heat/ice application    [x] other:     Other Objective/Functional Measures:    See FS   Post Treatment Pain Level (on 0 to 10) scale:   6 / 10     ASSESSMENT  Assessment/Changes in Function:     Significant improved tolerance to PROM.      []  See Progress Note/Recertification   Patient will continue to benefit from skilled PT services to modify and progress therapeutic interventions, address functional mobility deficits, address ROM deficits, address strength deficits, analyze and address soft tissue restrictions, analyze and cue movement patterns, analyze and modify body mechanics/ergonomics, assess and modify postural abnormalities, address imbalance/dizziness and instruct in home and community integration to attain remaining goals.    Progress toward goals / Updated goals:    Progressing towards STG1     PLAN  [x]  Upgrade activities as tolerated YES Continue plan of care   []  Discharge due to :    []  Other:      Therapist: Dominga Ellison, PT, DPT, MTC, CMTPT    Date: 6/22/2022 Time: 4:03 PM     Future Appointments   Date Time Provider Parker Del Angel   7/11/2022  2:15 PM Jeremie Quinonez, PT [facility]   7/13/2022 10:15 AM Jazmine Wyatt, INNA [facility]   7/18/2022  2:15 PM Jeremie Quinonez, PT [facility]   7/20/2022 10:15 AM Jeevan Gaston MD [facility] BS AMB   7/20/2022  3:45 PM Jeremie Quinonez, PT [facility]   7/25/2022  2:15 PM Jeremie Quinonez, PT [facility]   7/27/2022  9:45 AM Jeremie Quinonez, PT [facility]

## 2022-07-11 ENCOUNTER — HOSPITAL ENCOUNTER (OUTPATIENT)
Dept: PHYSICAL THERAPY | Age: 52
Discharge: HOME | End: 2022-07-11
Payer: OTHER GOVERNMENT

## 2022-07-11 PROCEDURE — 97110 THERAPEUTIC EXERCISES: CPT

## 2022-07-11 PROCEDURE — 97112 NEUROMUSCULAR REEDUCATION: CPT

## 2022-07-11 PROCEDURE — 97140 MANUAL THERAPY 1/> REGIONS: CPT

## 2022-07-11 PROCEDURE — 97014 ELECTRIC STIMULATION THERAPY: CPT

## 2022-07-12 NOTE — PROGRESS NOTES
PHYSICAL THERAPY - DAILY TREATMENT NOTE    Patient Name: Eneida Justcie        Date: 2022  : 1970   YES Patient  Verified  Visit #:   3   of   12  Insurance: Payor: [facility]N / Plan: Sheree Nicholson / Product Type: Federal Funded Programs /      In time: 215 Out time: 320   Total Treatment Time: 60     BCBS/Medicare Time Tracking (below)   Total Timed Codes (min):   1:1 Treatment Time:       TREATMENT AREA =  Pain in left shoulder [M25.512]    SUBJECTIVE  Pain Level (on 0 to 10 scale):  5  / 10   Medication Changes/New allergies or changes in medical history, any new surgeries or procedures? NO    If yes, update Summary List   Subjective Functional Status/Changes:  []  No changes reported     I havent worn the sling much for the last week.  Still sleeping in it and have to take the oxy most nights to be able to sleep       Modalities Rationale:     decrease inflammation, decrease pain and increase tissue extensibility to improve patient's ability to perform ADLs  15 min [x] Estim, type/location: IFC to L shoulder in supine                                    []  att     [x]  unatt     []  w/US     [x]  w/ice    []  w/heat    min []  Mechanical Traction: type/lbs                   []  pro   []  sup   []  int   []  cont    []  before manual    []  after manual    min []  Ultrasound, settings/location:      min []  Iontophoresis w/ dexamethasone, location:                                               []  take home patch       []  in clinic    min [x]  Ice     []  Heat    location/position:     min []  Vasopneumatic Device, press/temp: If using vaso (only need to measure limb vaso being performed on)      pre-treatment girth :       post-treatment girth :       measured at (landmark location) :      min []  Other:    [x] Skin assessment post-treatment (if applicable):    [x]  intact    [x]  redness- no adverse reaction     []redness - adverse reaction:        20 min Therapeutic Exercise:  [x]  See flow sheet   Rationale:      increase ROM and increase strength to improve the patients ability to perform unlimited ADLs     10 min Manual Therapy: Technique:      [] S/DTM []IASTM [x]PROM  [] Passive Stretching  []manual TPR  [x]Jt manipulation:Gr I [x] II [x]  III [] IV[] V[]  Treatment/Area:  L shoulder PROM in all planes, gentle oscillations at end range to dec mm guarding   Rationale:      decrease pain, increase ROM, increase tissue extensibility and decrease trigger points to improve patient's ability to perform ADLs  The manual therapy interventions were performed at a separate and distinct time from the therapeutic activities interventions. min Therapeutic Activity: [x]  See flow sheet, positioning while sleeping, sitting, driving; dressing   Rationale:    increase ROM, increase strength, and improve coordination to improve the patients mechanics with push, pull, carry and lift    15 min Neuromuscular Re-ed: [x]  See flow sheet   Rationale:    improve coordination, improve balance, and increase proprioception to improve the patients postural awareness, stability and motor control    Billed With/As:   [x] TE   [x] TA   [] Neuro   [] Self Care Patient Education: [x] Review HEP    [] Progressed/Changed HEP based on:   [x] positioning   [x] body mechanics   [] transfers   [x] heat/ice application    [x] other: Issued written HEP and reviewed importance of transition period out of sling     Other Objective/Functional Measures:    See FS, added pulleys and supine dowel ER   Post Treatment Pain Level (on 0 to 10) scale:   4  / 10     ASSESSMENT  Assessment/Changes in Function:     Fair tolerance to PROM due to pain and mm guarding. Repeated cueing to relax L UE even when pt was standing, not performing any exercise/movement. Difficulty performing PROM pulleys and counter flex/scap stretch without frequent cueing to limit UT mm guarding.  Poor proprioception of shoulder complex and []  See Progress Note/Recertification   Patient will continue to benefit from skilled PT services to modify and progress therapeutic interventions, address functional mobility deficits, address ROM deficits, address strength deficits, analyze and address soft tissue restrictions, analyze and cue movement patterns, analyze and modify body mechanics/ergonomics, assess and modify postural abnormalities, address imbalance/dizziness and instruct in home and community integration to attain remaining goals.    Progress toward goals / Updated goals:    Progressing towards STG1,fair compliance     PLAN  [x]  Upgrade activities as tolerated YES Continue plan of care   []  Discharge due to :    []  Other:      Therapist: Tsosie Aase, PT, DPT, MTC, CMTPT    Date: 7/11/2022 Time: 4:03 PM     Future Appointments   Date Time Provider Parker Del Angel   7/13/2022 10:15 AM Kristene Severe, PT [facility]   7/18/2022  2:15 PM Madi Whitehead PT [facility]   7/20/2022 10:15 AM Heaven Fernandes MD [facility]   7/20/2022  3:45 PM Madi Whitehead PT [facility]   7/25/2022  2:15 PM Madi Whitehead PT [facility]   7/27/2022  9:45 AM Madi Whitehead PT [facility]

## 2022-07-13 ENCOUNTER — HOSPITAL ENCOUNTER (OUTPATIENT)
Dept: PHYSICAL THERAPY | Age: 52
Discharge: HOME | End: 2022-07-13
Payer: OTHER GOVERNMENT

## 2022-07-13 PROCEDURE — 97110 THERAPEUTIC EXERCISES: CPT

## 2022-07-13 PROCEDURE — 97140 MANUAL THERAPY 1/> REGIONS: CPT

## 2022-07-13 NOTE — PROGRESS NOTES
PHYSICAL THERAPY - DAILY TREATMENT NOTE    Patient Name: Hamzah Rogers        Date: 2022  : 1970   YES Patient  Verified  Visit #:      12  Insurance: Payor: [facility] / Plan: Camilo Robbins / Product Type: Federal Funded Programs /      In time: 3943 Out time: 4760   Total Treatment Time: 65     BCBS/Medicare Time Tracking (below)   Total Timed Codes (min):  NA 1:1 Treatment Time:  NA     TREATMENT AREA =  Pain in left shoulder [M25.512]    SUBJECTIVE  Pain Level (on 0 to 10 scale):  0-4  / 10   Medication Changes/New allergies or changes in medical history, any new surgeries or procedures? NO    If yes, update Summary List   Subjective Functional Status/Changes:  []  No changes reported     I have begun wearing my sling more consistently and the pain has decreased. I have no pain when I am resting but it increases when I try to move my arm away from my body. Modalities Rationale:     decrease edema, decrease inflammation and decrease pain to improve patient's ability to perform pain-free ADLs.    15 min [x] Estim, type/location: IFC L shoulder supine                                     []  att     [x]  unatt     []  w/US     [x]  w/ice    []  w/heat    min []  Mechanical Traction: type/lbs                   []  pro   []  sup   []  int   []  cont    []  before manual    []  after manual    min []  Ultrasound, settings/location:      min []  Iontophoresis w/ dexamethasone, location:                                               []  take home patch       []  in clinic    min []  Ice     []  Heat    location/position:     min []  Vasopneumatic Device, press/temp:    If using vaso (only need to measure limb vaso being performed on)      pre-treatment girth :       post-treatment girth :       measured at (landmark location) :      min []  Other:    [x] Skin assessment post-treatment (if applicable):    [x]  intact    [x]  redness- no adverse reaction []redness - adverse reaction:        35 min Therapeutic Exercise:  [x]  See flow sheet   Rationale:      increase ROM, increase strength, improve coordination and increase proprioception to improve the patients ability to perform unlimited ADLs. 15 min Manual Therapy: Grade 2 AP mobs of GHJ, manually assisted post capsule stretch, PROM and stretching into flexion and ER   Rationale:      decrease pain, increase ROM, increase tissue extensibility and decrease edema  to improve patient's ability to Improve mobility per protocol  The manual therapy interventions were performed at a separate and distinct time from the therapeutic activities interventions. Billed With/As:   [x] TE   [] TA   [] Neuro   [] Self Care Patient Education: [x] Review HEP    [] Progressed/Changed HEP based on:   [] positioning   [] body mechanics   [] transfers   [] heat/ice application    [] other:      Other Objective/Functional Measures: Added dowel flexion stretch      Post Treatment Pain Level (on 0 to 10) scale:   4  / 10     ASSESSMENT  Assessment/Changes in Function:     Pt had pain with lowering during pulleys, cues for fast lowering improved symptoms. Good tolerance to dowel flexion when performing supine AAROM punch and transitioning into OH flexion compared to true flexion. []  See Progress Note/Recertification   Patient will continue to benefit from skilled PT services to modify and progress therapeutic interventions, address functional mobility deficits, address ROM deficits, address strength deficits, analyze and address soft tissue restrictions, analyze and cue movement patterns, analyze and modify body mechanics/ergonomics and assess and modify postural abnormalities to attain remaining goals. Progress toward goals / Updated goals:    Progressing towards STG 3.       PLAN  [x]  Upgrade activities as tolerated YES Continue plan of care   []  Discharge due to :    []  Other:      Therapist: John Bedolla DPT Date: 7/13/2022 Time: 11:00 AM     Future Appointments   Date Time Provider Parker Del Angel   7/18/2022  2:15 PM Daksha Wahl, PT [facility]   7/20/2022 10:15 AM Jazz Waters MD [facility]   7/20/2022  3:45 PM Daksha Wahl, PT [facility]   7/25/2022  2:15 PM Daksha Wahl, PT [facility]   7/27/2022  9:45 AM Daksha Wahl, PT [facility]

## 2022-07-18 ENCOUNTER — HOSPITAL ENCOUNTER (OUTPATIENT)
Dept: PHYSICAL THERAPY | Age: 52
Discharge: HOME | End: 2022-07-18
Payer: OTHER GOVERNMENT

## 2022-07-18 PROCEDURE — 97112 NEUROMUSCULAR REEDUCATION: CPT

## 2022-07-18 PROCEDURE — 97140 MANUAL THERAPY 1/> REGIONS: CPT

## 2022-07-18 PROCEDURE — 97014 ELECTRIC STIMULATION THERAPY: CPT

## 2022-07-18 PROCEDURE — 97110 THERAPEUTIC EXERCISES: CPT

## 2022-07-18 NOTE — PROGRESS NOTES
PHYSICAL THERAPY - DAILY TREATMENT NOTE    Patient Name: Precious Luque        Date: 2022  : 1970   YES Patient  Verified  Visit #:      of   12  Insurance: Payor: [facility] / Plan: Reginald Soto / Product Type: Federal Funded Programs /      In time: 215 Out time: 320   Total Treatment Time: 60     BCBS/Medicare Time Tracking (below)   Total Timed Codes (min):   1:1 Treatment Time:       TREATMENT AREA =  Pain in left shoulder [M25.512]    SUBJECTIVE  Pain Level (on 0 to 10 scale):  2-3  / 10   Medication Changes/New allergies or changes in medical history, any new surgeries or procedures?     NO    If yes, update Summary List   Subjective Functional Status/Changes:  []  No changes reported     I put the sling back on last week and haven't needed to take the pain meds to be able to fall asleep       Modalities Rationale:     decrease inflammation, decrease pain and increase tissue extensibility to improve patient's ability to perform ADLs  15 min [x] Estim, type/location: IFC to L shoulder in sitting                                    []  att     [x]  unatt     []  w/US     [x]  w/ice    []  w/heat    min []  Mechanical Traction: type/lbs                   []  pro   []  sup   []  int   []  cont    []  before manual    []  after manual    min []  Ultrasound, settings/location:      min []  Iontophoresis w/ dexamethasone, location:                                               []  take home patch       []  in clinic    min [x]  Ice     []  Heat    location/position: L shoulder in supine    min []  Vasopneumatic Device, press/temp: If using vaso (only need to measure limb vaso being performed on)      pre-treatment girth :       post-treatment girth :       measured at (landmark location) :      min []  Other:    [x] Skin assessment post-treatment (if applicable):    [x]  intact    [x]  redness- no adverse reaction     []redness - adverse reaction:        20 min Therapeutic Exercise:  [x]  See flow sheet   Rationale:      increase ROM and increase strength to improve the patients ability to perform unlimited ADLs     15 min Manual Therapy: Technique:      [] S/DTM []IASTM [x]PROM  [] Passive Stretching  []manual TPR  [x]Jt manipulation:Gr I [x] II [x]  III [] IV[] V[]  Treatment/Area:  L shoulder PROM in all planes, gentle oscillations at end range    Rationale:      decrease pain, increase ROM, increase tissue extensibility and decrease trigger points to improve patient's ability to perform ADLs  The manual therapy interventions were performed at a separate and distinct time from the therapeutic activities interventions. min Therapeutic Activity: [x]  See flow sheet, positioning while sleeping, sitting, driving; dressing   Rationale:    increase ROM, increase strength, and improve coordination to improve the patients mechanics with push, pull, carry and lift    10 min Neuromuscular Re-ed: [x]  See flow sheet   Rationale:    improve coordination, improve balance, and increase proprioception to improve the patients postural awareness, stability and motor control    Billed With/As:   [x] TE   [] TA   [x] Neuro   [] Self Care Patient Education: [x] Review HEP    [] Progressed/Changed HEP based on:   [x] positioning   [x] body mechanics   [] transfers   [x] heat/ice application    [x] other: Issued written HEP and reviewed     Other Objective/Functional Measures:    See FS, added submax iso 5 way, finger ladder   Post Treatment Pain Level (on 0 to 10) scale:   4  / 10     ASSESSMENT  Assessment/Changes in Function:     Repeated cueing needed for appropriate level of mm contraction intensity during submax iso, proprioception of GHJ/scapula.  Cont to present with significant UT mm guarding and cues needed to relax to allow for full available ROM     []  See Progress Note/Recertification   Patient will continue to benefit from skilled PT services to modify and progress therapeutic interventions, address functional mobility deficits, address ROM deficits, address strength deficits, analyze and address soft tissue restrictions, analyze and cue movement patterns, analyze and modify body mechanics/ergonomics, assess and modify postural abnormalities, address imbalance/dizziness and instruct in home and community integration to attain remaining goals.    Progress toward goals / Updated goals:    Met STG1     PLAN  [x]  Upgrade activities as tolerated YES Continue plan of care   []  Discharge due to :    []  Other:      Therapist: Acacia Aguilar, PT, DPT, MTC, CMTPT    Date: 7/18/2022 Time: 4:03 PM     Future Appointments   Date Time Provider Parker Del Angel   7/20/2022 10:15 AM Bailey Narvaez MD [facility]   7/20/2022  3:45 PM Janelle Frank PT [facility]   7/25/2022  2:15 PM Janelle Frank PT [facility]   7/27/2022  9:45 AM Janelle Frank PT [facility]

## 2022-07-20 ENCOUNTER — HOSPITAL ENCOUNTER (OUTPATIENT)
Dept: PHYSICAL THERAPY | Age: 52
Discharge: HOME | End: 2022-07-20
Payer: OTHER GOVERNMENT

## 2022-07-20 ENCOUNTER — OFFICE VISIT (OUTPATIENT)
Dept: ORTHOPEDIC SURGERY | Age: 52
End: 2022-07-20
Payer: OTHER GOVERNMENT

## 2022-07-20 DIAGNOSIS — M75.122 NONTRAUMATIC COMPLETE TEAR OF LEFT ROTATOR CUFF: Primary | ICD-10-CM

## 2022-07-20 PROCEDURE — 97140 MANUAL THERAPY 1/> REGIONS: CPT

## 2022-07-20 PROCEDURE — 97110 THERAPEUTIC EXERCISES: CPT

## 2022-07-20 PROCEDURE — 99024 POSTOP FOLLOW-UP VISIT: CPT | Performed by: ORTHOPAEDIC SURGERY

## 2022-07-20 PROCEDURE — 97014 ELECTRIC STIMULATION THERAPY: CPT

## 2022-07-20 PROCEDURE — 97112 NEUROMUSCULAR REEDUCATION: CPT

## 2022-07-20 RX ORDER — HYDROCODONE BITARTRATE AND ACETAMINOPHEN 5; 325 MG/1; MG/1
TABLET ORAL
COMMUNITY
Start: 2021-12-23

## 2022-07-20 RX ORDER — IBUPROFEN 800 MG/1
TABLET ORAL
COMMUNITY
Start: 2021-12-23

## 2022-07-20 RX ORDER — CETIRIZINE HCL 10 MG
10 TABLET ORAL DAILY
COMMUNITY
Start: 2022-06-22

## 2022-07-20 NOTE — PROGRESS NOTES
Patient: Precious Luque                MRN: [id number]       SSN: [id number]  YOB: 1970        AGE: 47 y.o. SEX: male  There is no height or weight on file to calculate BMI. PCP: Priyank Rasmussen MD  07/20/22    Chief Complaint: Left shoulder follow-up    HPI: Precious Luque is a 47 y.o. male patient who returns today for his left shoulder. He is now about 2 months out from his left shoulder rotator cuff repair. He still has some stiffness. He is in physical therapy. Past Medical History:   Diagnosis Date    COVID-19 01/2022    GERD (gastroesophageal reflux disease)     Sleep apnea     no cpap       No family history on file. Current Outpatient Medications   Medication Sig Dispense Refill    cetirizine (ZYRTEC) 10 mg tablet Take 10 mg by mouth in the morning. ibuprofen (MOTRIN) 800 mg tablet       HYDROcodone-acetaminophen (NORCO) 5-325 mg per tablet       Advair -21 mcg/actuation inhaler Take 2 Puffs by inhalation two (2) times a day. senna-docusate (Colace 2-In-1) 8.6-50 mg per tablet Take 1 Tablet by mouth daily. Indications: constipation 30 Tablet 0    esomeprazole (NEXIUM) 20 mg capsule Take 20 mg by mouth daily. zolpidem (AMBIEN) 10 mg tablet Take 10 mg by mouth nightly as needed. No Known Allergies    Past Surgical History:   Procedure Laterality Date    HX ACL RECONSTRUCTION      HX HERNIA REPAIR      HX KNEE ARTHROSCOPY Right     HX ORCHIECTOMY  2006       Social History     Socioeconomic History    Marital status:      Spouse name: Not on file    Number of children: Not on file    Years of education: Not on file    Highest education level: Not on file   Occupational History    Not on file   Tobacco Use    Smoking status: Never    Smokeless tobacco: Never   Vaping Use    Vaping Use: Never used   Substance and Sexual Activity    Alcohol use:  Yes     Alcohol/week: 2.0 standard drinks     Types: 2 Cans of beer per week     Comment: occasional    Drug use: Never    Sexual activity: Not on file   Other Topics Concern    Not on file   Social History Narrative    Not on file     Social Determinants of Health     Financial Resource Strain: Not on file   Food Insecurity: Not on file   Transportation Needs: Not on file   Physical Activity: Not on file   Stress: Not on file   Social Connections: Not on file   Intimate Partner Violence: Not on file   Housing Stability: Not on file       REVIEW OF SYSTEMS:      No changes from previous review of systems unless noted. PHYSICAL EXAMINATION:  There were no vitals taken for this visit. There is no height or weight on file to calculate BMI. GENERAL: Alert and oriented x3, in no acute distress. HEENT: Normocephalic, atraumatic. Left shoulder is forward flexion about 140 degrees today active and passive. Gentle rotator cuff strength testing does not reveal any obvious pain. IMAGING:  Imaging read by myself and interpreted as follows:      ASSESSMENT & PLAN  Diagnosis: 2 months status post left rotator cuff repair    Matt Givens will continue to work on his range of motion. I stressed the importance of working on range of motion. Continue physical therapy. Follow-up in a month. Prescription medication management discussed with patient. Electronically signed by: Michelle Martin MD    Note: This note was completed using voice recognition software.   Any typographical/name errors or mistakes are unintentional.

## 2022-07-20 NOTE — PROGRESS NOTES
PHYSICAL THERAPY - DAILY TREATMENT NOTE    Patient Name: Mayuri Fernandez        Date: 2022  : 1970   YES Patient  Verified  Visit #:      12  Insurance: Payor: [facility] / Plan: [place] / Product Type: Federal Funded Programs /      In time: 350 Out time: 455   Total Treatment Time: 60     BCBS/Medicare Time Tracking (below)   Total Timed Codes (min):   1:1 Treatment Time:       TREATMENT AREA =  Pain in left shoulder [M25.512]    SUBJECTIVE  Pain Level (on 0 to 10 scale):  2-3  / 10   Medication Changes/New allergies or changes in medical history, any new surgeries or procedures?     NO    If yes, update Summary List   Subjective Functional Status/Changes:  []  No changes reported   The surgeon is pleased with the progress and agreed its a good idea to get in the pool and start moving my arm more       Modalities Rationale:     decrease inflammation, decrease pain and increase tissue extensibility to improve patient's ability to perform ADLs  15 min [x] Estim, type/location: IFC to L shoulder in supine                                    []  att     [x]  unatt     []  w/US     [x]  w/ice    []  w/heat    min []  Mechanical Traction: type/lbs                   []  pro   []  sup   []  int   []  cont    []  before manual    []  after manual    min []  Ultrasound, settings/location:      min []  Iontophoresis w/ dexamethasone, location:                                               []  take home patch       []  in clinic    min [x]  Ice     []  Heat    location/position: L shoulder in supine    min []  Vasopneumatic Device, press/temp: If using vaso (only need to measure limb vaso being performed on)      pre-treatment girth :       post-treatment girth :       measured at (landmark location) :      min []  Other:    [x] Skin assessment post-treatment (if applicable):    [x]  intact    [x]  redness- no adverse reaction     []redness - adverse reaction:        20 min Therapeutic Exercise:  [x]  See flow sheet   Rationale:      increase ROM and increase strength to improve the patients ability to perform unlimited ADLs     15 min Manual Therapy: Technique:      [] S/DTM []IASTM [x]PROM  [] Passive Stretching  []manual TPR  [x]Jt manipulation:Gr I [x] II [x]  III [] IV[] V[]  Treatment/Area:  L shoulder PROM in all planes including ER throughout varying degrees of abduction, gentle oscillations at end range    Rationale:      decrease pain, increase ROM, increase tissue extensibility and decrease trigger points to improve patient's ability to perform ADLs  The manual therapy interventions were performed at a separate and distinct time from the therapeutic activities interventions. min Therapeutic Activity: [x]  See flow sheet, positioning while sleeping, sitting, driving; dressing   Rationale:    increase ROM, increase strength, and improve coordination to improve the patients mechanics with push, pull, carry and lift    10 min Neuromuscular Re-ed: [x]  See flow sheet   Rationale:    improve coordination, improve balance, and increase proprioception to improve the patients postural awareness, stability and motor control    Billed With/As:   [x] TE   [] TA   [x] Neuro   [] Self Care Patient Education: [x] Review HEP    [] Progressed/Changed HEP based on:   [x] positioning   [x] body mechanics   [] transfers   [x] heat/ice application    [x] other: supine AROM punch     Other Objective/Functional Measures:    See FS, added supine AROM punch and S/L ER   Post Treatment Pain Level (on 0 to 10) scale:   3  / 10     ASSESSMENT  Assessment/Changes in Function:     Unable to tolerate post shoulder stretch due to pain despite adjusting position.  Repeated cueing needed to address scapular proprioception and dec UT compensation during new AROM exercise     []  See Progress Note/Recertification   Patient will continue to benefit from skilled PT services to modify and progress therapeutic interventions, address functional mobility deficits, address ROM deficits, address strength deficits, analyze and address soft tissue restrictions, analyze and cue movement patterns, analyze and modify body mechanics/ergonomics, assess and modify postural abnormalities, address imbalance/dizziness and instruct in home and community integration to attain remaining goals.    Progress toward goals / Updated goals:    Progressing towards STG2     PLAN  [x]  Upgrade activities as tolerated YES Continue plan of care   []  Discharge due to :    []  Other:      Therapist: Terry Cochran PT, DPT, MTC, CMTPT    Date: 7/20/2022 Time: 4:03 PM     Future Appointments   Date Time Provider Parker Del Angel   7/25/2022  2:15 PM Savannah Rico, PT [facility]   7/27/2022  9:45 AM Savannah Rico PT [facility]   9/21/2022  9:45 AM Jose Ramon Pandey MD [facility]

## 2022-07-25 ENCOUNTER — HOSPITAL ENCOUNTER (OUTPATIENT)
Dept: PHYSICAL THERAPY | Age: 52
Discharge: HOME | End: 2022-07-25
Payer: OTHER GOVERNMENT

## 2022-07-25 PROCEDURE — 97110 THERAPEUTIC EXERCISES: CPT

## 2022-07-25 PROCEDURE — 97112 NEUROMUSCULAR REEDUCATION: CPT

## 2022-07-25 PROCEDURE — 97140 MANUAL THERAPY 1/> REGIONS: CPT

## 2022-07-25 PROCEDURE — 97014 ELECTRIC STIMULATION THERAPY: CPT

## 2022-07-25 NOTE — PROGRESS NOTES
PHYSICAL THERAPY - DAILY TREATMENT NOTE    Patient Name: Melissa Pollock        Date: 2022  : 1970   YES Patient  Verified  Visit #:     Insurance: Payor: [facility] CCN / Plan: Brown Carl[employer] / Product Type: Federal Funded Programs /      In time: 220 Out time: 330   Total Treatment Time: 65     BCBS/Medicare Time Tracking (below)   Total Timed Codes (min):   1:1 Treatment Time:       TREATMENT AREA =  Pain in left shoulder [M25.512]    SUBJECTIVE  Pain Level (on 0 to 10 scale):  2-3  / 10   Medication Changes/New allergies or changes in medical history, any new surgeries or procedures? NO    If yes, update Summary List   Subjective Functional Status/Changes:  []  No changes reported     Sore today from swimming a lot.  Its getting a little easier to reach my opposite arm       Modalities Rationale:     decrease inflammation, decrease pain and increase tissue extensibility to improve patient's ability to perform ADLs  15 min [x] Estim, type/location: IFC to L shoulder in semi-reclined position                                    []  att     [x]  unatt     []  w/US     [x]  w/ice    []  w/heat    min []  Mechanical Traction: type/lbs                   []  pro   []  sup   []  int   []  cont    []  before manual    []  after manual    min []  Ultrasound, settings/location:      min []  Iontophoresis w/ dexamethasone, location:                                               []  take home patch       []  in clinic    min [x]  Ice     []  Heat    location/position: L shoulder in supine    min []  Vasopneumatic Device, press/temp: If using vaso (only need to measure limb vaso being performed on)      pre-treatment girth :       post-treatment girth :       measured at (landmark location) :      min []  Other:    [x] Skin assessment post-treatment (if applicable):    [x]  intact    [x]  redness- no adverse reaction     []redness - adverse reaction:        20 min Therapeutic Exercise: [x]  See flow sheet   Rationale:      increase ROM and increase strength to improve the patients ability to perform unlimited ADLs     15 min Manual Therapy: Technique:      [] S/DTM []IASTM [x]PROM  [] Passive Stretching  []manual TPR  [x]Jt manipulation:Gr I [x] II [x]  III [] IV[] V[]  Treatment/Area:  L shoulder PROM in all planes including ER throughout varying degrees of abduction, gentle oscillations at end range    Rationale:      decrease pain, increase ROM, increase tissue extensibility and decrease trigger points to improve patient's ability to perform ADLs  The manual therapy interventions were performed at a separate and distinct time from the therapeutic activities interventions. min Therapeutic Activity: [x]  See flow sheet, positioning while sleeping, sitting, driving; dressing   Rationale:    increase ROM, increase strength, and improve coordination to improve the patients mechanics with push, pull, carry and lift    15 min Neuromuscular Re-ed: [x]  See flow sheet   Rationale:    improve coordination, improve balance, and increase proprioception to improve the patients postural awareness, stability and motor control    Billed With/As:   [x] TE   [] TA   [x] Neuro   [] Self Care Patient Education: [x] Review HEP    [] Progressed/Changed HEP based on:   [x] positioning   [x] body mechanics   [] transfers   [x] heat/ice application    [x] other:      Other Objective/Functional Measures:    See FS, progressed to semi-reclined punch, added post shoulder stretch  PROM flex 140  [email or web address]   Post Treatment Pain Level (on 0 to 10) scale:   3  / 10     ASSESSMENT  Assessment/Changes in Function:     See PN.  Unable to maintain neutral rotation throughout full ROM into flexion     []  See Progress Note/Recertification   Patient will continue to benefit from skilled PT services to modify and progress therapeutic interventions, address functional mobility deficits, address ROM deficits, address strength deficits, analyze and address soft tissue restrictions, analyze and cue movement patterns, analyze and modify body mechanics/ergonomics, assess and modify postural abnormalities, address imbalance/dizziness and instruct in home and community integration to attain remaining goals.    Progress toward goals / Updated goals:    See PN     PLAN  [x]  Upgrade activities as tolerated YES Continue plan of care   []  Discharge due to :    []  Other:      Therapist: Louise Baker PT, DPT, MTC, CMTPT    Date: 7/25/2022 Time: 4:03 PM     Future Appointments   Date Time Provider Parker Del Angel   7/27/2022  9:45 AM Morrell Paget, PT [facility]   9/21/2022  9:45 AM Miguel Pandey MD [facility]

## 2022-07-26 NOTE — PROGRESS NOTES
[facility] PHYSICAL THERAPY  [address] [facility], [address] [place] [address] Road -   Phone: [phone]  Fax: [phone]  [x]  PROGRESS NOTE  []   [facility] SUMMARY  Patient Name: Tina Vazquez : 1970   Treatment Diagnosis: Pain in left shoulder [M25.512]     Referral Source: Marti Moncada NP     Date of Initial Visit: 22 Attended Visits: 7 Missed Visits: 0     SUMMARY OF TREATMENT  Therapeutic exercises including ROM, strengthening and stretching; manual therapy including joint and soft tissue manipulation; postural re-education, modalities: CP and e-stim, HEP instruction. CURRENT STATUS  Patient is a 47 y.o. male who presents to [facility] RCR and biceps tenodesis on 22. Initial progress was slow due to fair compliance with use of sling and movement restrictions resulting in increased pain and mm guarding. Progress in recent visits since transitioning out of sling and initiating AAROM>AROM has been good. Although reported pain levels have decreased Mr Blanquita Brown continues to have increased pain at night when attempting to sleep in supine position and is unable to tolerate without use of pillows to raise arm out of extension position to slightly flexed with shoulder im supported neutral alignment. Goal/Measure of Progress Goal Met? 1. Patient to be independent and compliant with initial HEP to prevent further disability. Status at last Eval: Established Current Status: I with HEP yes   2. Patient will report decreased c/o pain to < or = 2/10 to facilitate ease with sleeping with manageable sx in L shoulder using HEP and positioning techniques. Status at last Eval: -8/10 Current Status: 3-4/10 Partially met   3. Improve L shoulder PROM to [facility] so ROM is available for transition out of sling to AAROM>AROM.    Status at last Eval: flex 90°  [email or web address] abd 20° significant pain and mm guarding Current Status: flex 140°, [email or web address] abd 55°  progressing     New Goals to be achieved in __3-4__  Weeks:  1. Patient to be independent & compliant with a progressive, high level HEP in order to maintain gains made in physical therapy. 2.  Improve L shoulder AROM to [facility] so ROM is available for [facility] reaching and dressing. 3.  Improve FOTO score to L indicating significant functional improvement in order to return to [facility] to lift, carry and reach. RECOMMENDATIONS  Continue therapy per initial plan/protocol    Specifics: The patient could benefit from continued PT 2-3x/week for 3-4 weeks to progress towards achieving all LTGs. If you have any questions/comments please contact us directly at [phone]. Thank you for allowing us to assist in the care of your patient. Therapist Signature: Rex Banda, PT, DPT, MTC, CMTPT Date: 7/25/2022   Reporting Period: NA Time: 9:39 PM   NOTE TO PHYSICIAN:  PLEASE COMPLETE THE ORDERS BELOW AND FAX TO   [facility]: [phone]  If you are unable to process this request in 24 hours please contact our office: [phone]    ___ I have read the above report and request that my patient continue as recommended.   ___ I have read the above report and request that my patient continue therapy with the following changes/special instructions:_________________________________________________________   ___ I have read the above report and request that my patient be discharged from therapy.      Physician Signature:        Date:       Time:        Heather Hooker NP

## 2022-07-27 ENCOUNTER — HOSPITAL ENCOUNTER (OUTPATIENT)
Dept: PHYSICAL THERAPY | Age: 52
Discharge: HOME | End: 2022-07-27
Payer: OTHER GOVERNMENT

## 2022-07-27 PROCEDURE — 97014 ELECTRIC STIMULATION THERAPY: CPT

## 2022-07-27 PROCEDURE — 97110 THERAPEUTIC EXERCISES: CPT

## 2022-07-27 PROCEDURE — 97112 NEUROMUSCULAR REEDUCATION: CPT

## 2022-07-27 NOTE — PROGRESS NOTES
PHYSICAL THERAPY - DAILY TREATMENT NOTE    Patient Name: Hans Cornejo        Date: 2022  : 1970   YES Patient  Verified  Visit #:     Insurance: Payor: [facility] / Plan: Dereck Hickman / Product Type: Federal Funded Programs /      In time: 866 Out time: 1100   Total Treatment Time: 65     BCBS/Medicare Time Tracking (below)   Total Timed Codes (min):   1:1 Treatment Time:       TREATMENT AREA =  Pain in left shoulder [M25.512]    SUBJECTIVE  Pain Level (on 0 to 10 scale):  2-3  / 10   Medication Changes/New allergies or changes in medical history, any new surgeries or procedures?     NO    If yes, update Summary List   Subjective Functional Status/Changes:  []  No changes reported     Went swimming again yesterday and am a little sore just from using my arm but no more sore than I would expect to feel       Modalities Rationale:     decrease inflammation, decrease pain and increase tissue extensibility to improve patient's ability to perform ADLs  15 min [x] Estim, type/location: IFC to L shoulder in sitting position                                    []  att     [x]  unatt     []  w/US     [x]  w/ice    []  w/heat    min []  Mechanical Traction: type/lbs                   []  pro   []  sup   []  int   []  cont    []  before manual    []  after manual    min []  Ultrasound, settings/location:      min []  Iontophoresis w/ dexamethasone, location:                                               []  take home patch       []  in clinic    min [x]  Ice     []  Heat    location/position: L shoulder in supine    min []  Vasopneumatic Device, press/temp: If using vaso (only need to measure limb vaso being performed on)      pre-treatment girth :       post-treatment girth :       measured at (landmark location) :      min []  Other:    [x] Skin assessment post-treatment (if applicable):    [x]  intact    [x]  redness- no adverse reaction     []redness - adverse reaction: 35 min Therapeutic Exercise:  [x]  See flow sheet   Rationale:      increase ROM and increase strength to improve the patients ability to perform unlimited ADLs     15 min Neuromuscular Re-ed: [x]  See flow sheet   Rationale:    improve coordination, improve balance, and increase proprioception to improve the patients postural awareness, stability and motor control    Billed With/As:   [x] TE   [] TA   [x] Neuro   [] Self Care Patient Education: [x] Review HEP    [] Progressed/Changed HEP based on:   [x] positioning   [x] body mechanics   [] transfers   [x] heat/ice application    [x] other:      Other Objective/Functional Measures:    See FS, added S/L abd, prone row with scap retract and performed dowel ER stretch in both 30 and 70 degrees abd   Post Treatment Pain Level (on 0 to 10) scale:   2  / 10     ASSESSMENT  Assessment/Changes in Function:     Difficulty maintaining pure flexion during AROM, unable to control IR collapse during eccentric phase of flexion due to weakness and capsular tightness. Cueing needed for normal mm firing pattern, inc recruitment of external rotators and correct scapular mechanics     []  See Progress Note/Recertification   Patient will continue to benefit from skilled PT services to modify and progress therapeutic interventions, address functional mobility deficits, address ROM deficits, address strength deficits, analyze and address soft tissue restrictions, analyze and cue movement patterns, analyze and modify body mechanics/ergonomics, assess and modify postural abnormalities, address imbalance/dizziness and instruct in home and community integration to attain remaining goals.    Progress toward goals / Updated goals:    Progressing towards STG3     PLAN  [x]  Upgrade activities as tolerated YES Continue plan of care   []  Discharge due to :    []  Other:      Therapist: Pavithra Garcia, PT, DPT, MTC, CMTPT    Date: 7/27/2022 Time: 4:03 PM     Future Appointments   Date Time Provider Parker Del Angel   8/2/2022  8:45 AM Gracia Billings, PT [facility]   8/4/2022  5:00 PM Gracia Billings, PT [facility]   9/21/2022  9:45 AM Shabnam Pandey MD [facility] AM[facility]

## 2022-08-02 ENCOUNTER — HOSPITAL ENCOUNTER (OUTPATIENT)
Dept: PHYSICAL THERAPY | Age: 52
Discharge: HOME | End: 2022-08-02
Payer: OTHER GOVERNMENT

## 2022-08-02 PROCEDURE — 97110 THERAPEUTIC EXERCISES: CPT

## 2022-08-02 PROCEDURE — 97014 ELECTRIC STIMULATION THERAPY: CPT

## 2022-08-02 PROCEDURE — 97112 NEUROMUSCULAR REEDUCATION: CPT

## 2022-08-02 PROCEDURE — 97140 MANUAL THERAPY 1/> REGIONS: CPT

## 2022-08-02 NOTE — PROGRESS NOTES
PHYSICAL THERAPY - DAILY TREATMENT NOTE    Patient Name: Xiomy Wise        Date: 2022  : 1970   YES Patient  Verified  Visit #:     Insurance: Payor: [facility] CCN / Plan: Annette Sow / Product Type: Federal Funded Programs /      In time: 850 Out time: 614   Total Treatment Time: 60     BCBS/Medicare Time Tracking (below)   Total Timed Codes (min):  NA 1:1 Treatment Time:  NA     TREATMENT AREA =  Pain in left shoulder [M25.512]    SUBJECTIVE  Pain Level (on 0 to 10 scale):  1-2   10   Medication Changes/New allergies or changes in medical history, any new surgeries or procedures? NO    If yes, update Summary List   Subjective Functional Status/Changes:  []  No changes reported     I still end up in the recliner each night because im not comfortable sleeping. My resting pain is decreasig. Modalities Rationale:     decrease edema, decrease inflammation, and decrease pain to improve patient's ability to perform pain-free ADLs.    15 min [x] Estim, type/location: IF L shou                                     []  att     [x]  unatt     []  w/US     [x]  w/ice    []  w/heat    min []  Mechanical Traction: type/lbs                   []  pro   []  sup   []  int   []  cont    []  before manual    []  after manual    min []  Ultrasound, settings/location:      min []  Iontophoresis w/ dexamethasone, location:                                               []  take home patch       []  in clinic   10 min [x]  Ice     []  Heat    location/position: L shoulder    min []  Vasopneumatic Device, press/temp:    If using vaso (only need to measure limb vaso being performed on)      pre-treatment girth :       post-treatment girth :       measured at (landmark location) :      min []  Other:    [x] Skin assessment post-treatment (if applicable):    [x]  intact    [x]  redness- no adverse reaction                  []redness - adverse reaction:        20 min Therapeutic Exercise:  [x]  See flow sheet   Rationale:      increase ROM, increase strength, and improve coordination to improve the patients ability to perform unlimited ADLs. 10 min Manual Therapy: PROM and gentle stretching into flexion, ER and abd, grade 2 AP mobs of GHJ    Rationale:      decrease pain, increase ROM, increase tissue extensibility, and decrease edema  to improve patient's ability to improve motion per protocol  The manual therapy interventions were performed at a separate and distinct time from the therapeutic activities interventions. 15 min Neuromuscular Re-ed: [x]  See flow sheet   Rationale:    increase strength, improve coordination, and increase proprioception to improve the patients ability to improve stability of L shoulder during [place] motion    Billed With/As:   [] TE   [] TA   [] Neuro   [] Self Care Patient Education: [x] Review HEP    [] Progressed/Changed HEP based on:   [] positioning   [] body mechanics   [] transfers   [] heat/ice application    [] other:      Other Objective/Functional Measures:    Performed ABD isometrics against crys roll due to high levels of pain with S/L ABD AROM     Post Treatment Pain Level (on 0 to 10) scale:   2  / 10     ASSESSMENT  Assessment/Changes in Function:     Visual cues required during pulleys to improve ER moment during elevation; good tolerance although ROM was limited. No increase in pain with exercises today although pt did work to fatigue by end of session . []  See Progress Note/Recertification   Patient will continue to benefit from skilled PT services to modify and progress therapeutic interventions, address functional mobility deficits, address ROM deficits, address strength deficits, analyze and address soft tissue restrictions, analyze and cue movement patterns, analyze and modify body mechanics/ergonomics, and assess and modify postural abnormalities to attain remaining goals.    Progress toward goals / Updated goals:    Progressing towards STG 3.       PLAN  [x]  Upgrade activities as tolerated YES Continue plan of care   []  Discharge due to :    []  Other:      Therapist: Kristie Grier DPT    Date: 8/2/2022 Time: 9:00 AM     Future Appointments   Date Time Provider Parker Del Angel   8/4/2022  5:00 PM Kayden Whittington, PT [facility]   9/21/2022  9:45 AM Ayo Pandey MD [facility]

## 2022-08-04 ENCOUNTER — HOSPITAL ENCOUNTER (OUTPATIENT)
Dept: PHYSICAL THERAPY | Age: 52
Discharge: HOME | End: 2022-08-04
Payer: OTHER GOVERNMENT

## 2022-08-04 PROCEDURE — 97140 MANUAL THERAPY 1/> REGIONS: CPT

## 2022-08-04 PROCEDURE — 97014 ELECTRIC STIMULATION THERAPY: CPT

## 2022-08-04 PROCEDURE — 97112 NEUROMUSCULAR REEDUCATION: CPT

## 2022-08-04 PROCEDURE — 97110 THERAPEUTIC EXERCISES: CPT

## 2022-08-04 NOTE — PROGRESS NOTES
PHYSICAL THERAPY - DAILY TREATMENT NOTE    Patient Name: Anderson Mode        Date: 2022  : 1970   YES Patient  Verified  Visit #:   10   of   12  Insurance: Payor: Dandy Gil / Plan: Kathy Fatima / Product Type: Federal Funded Programs /      In time: 962 Out time: 610   Total Treatment Time: 60     BCBS/Medicare Time Tracking (below)   Total Timed Codes (min):  NA 1:1 Treatment Time:  NA     TREATMENT AREA =  Pain in left shoulder [M25.512]    SUBJECTIVE  Pain Level (on 0 to 10 scale):  3  / 10   Medication Changes/New allergies or changes in medical history, any new surgeries or procedures? NO    If yes, update Summary List   Subjective Functional Status/Changes:  []  No changes reported     I am stiff today but the pain is steadily decreasing. Modalities Rationale:     decrease edema, decrease inflammation, and decrease pain to improve patient's ability to perform pain-free ADLs.    15 min [x] Estim, type/location: IFC L shoulder                                     []  att     [x]  unatt     []  w/US     [x]  w/ice    []  w/heat    min []  Mechanical Traction: type/lbs                   []  pro   []  sup   []  int   []  cont    []  before manual    []  after manual    min []  Ultrasound, settings/location:      min []  Iontophoresis w/ dexamethasone, location:                                               []  take home patch       []  in clinic    min [x]  Ice     []  Heat    location/position:     min []  Vasopneumatic Device, press/temp:    If using vaso (only need to measure limb vaso being performed on)      pre-treatment girth :       post-treatment girth :       measured at (landmark location) :      min []  Other:    [x] Skin assessment post-treatment (if applicable):    [x]  intact    [x]  redness- no adverse reaction                  []redness - adverse reaction:        20 min Therapeutic Exercise:  [x]  See flow sheet   Rationale:      increase ROM, increase strength, and improve coordination to improve the patients ability to perform unlimited ADLs. 10 min Manual Therapy: PROM and gentle stretching into flexion, ER and abd, grade 2 AP mobs of GHJ    Rationale:      decrease pain, increase ROM, increase tissue extensibility, and decrease edema  to improve patient's ability to improve motion per protocol  The manual therapy interventions were performed at a separate and distinct time from the therapeutic activities interventions. 15 min Neuromuscular Re-ed: [x]  See flow sheet   Rationale:    increase strength, improve coordination, and increase proprioception to improve the patients ability to improve stability of L shoulder during [place] motion    Billed With/As:   [] TE   [] TA   [] Neuro   [] Self Care Patient Education: [x] Review HEP    [] Progressed/Changed HEP based on:   [] positioning   [] body mechanics   [] transfers   [] heat/ice application    [] other:      Other Objective/Functional Measures: Added supine protraction and straight arm sleeper stretch. Post Treatment Pain Level (on 0 to 10) scale:   1  / 10     ASSESSMENT  Assessment/Changes in Function:     Improved tolerance to AROM today without significant muscle fatigue noted. Pt was encouraged to stretch daily and perform AROM exercises only 3-4x/week to prevent inflammation/overuse to L shoulder. []  See Progress Note/Recertification   Patient will continue to benefit from skilled PT services to modify and progress therapeutic interventions, address functional mobility deficits, address ROM deficits, address strength deficits, analyze and address soft tissue restrictions, analyze and cue movement patterns, analyze and modify body mechanics/ergonomics, and assess and modify postural abnormalities to attain remaining goals. Progress toward goals / Updated goals:    Progressing towards STG 3.       PLAN  [x]  Upgrade activities as tolerated YES Continue plan of care   [] Discharge due to :    []  Other:      Therapist: Angel Andres DPT    Date: 8/4/2022 Time: 9:00 AM     Future Appointments   Date Time Provider Parker Del Angel   9/21/2022  9:45 AM Deuce Lr MD [facility] AM[facility]

## 2022-08-09 ENCOUNTER — APPOINTMENT (OUTPATIENT)
Dept: PHYSICAL THERAPY | Age: 52
End: 2022-08-09
Payer: OTHER GOVERNMENT

## 2022-08-12 ENCOUNTER — APPOINTMENT (OUTPATIENT)
Dept: PHYSICAL THERAPY | Age: 52
End: 2022-08-12
Payer: OTHER GOVERNMENT

## 2022-08-24 ENCOUNTER — HOSPITAL ENCOUNTER (OUTPATIENT)
Dept: PHYSICAL THERAPY | Age: 52
Discharge: HOME | End: 2022-08-24
Payer: OTHER GOVERNMENT

## 2022-08-24 PROCEDURE — 97014 ELECTRIC STIMULATION THERAPY: CPT

## 2022-08-24 PROCEDURE — 97112 NEUROMUSCULAR REEDUCATION: CPT

## 2022-08-24 PROCEDURE — 97140 MANUAL THERAPY 1/> REGIONS: CPT

## 2022-08-24 PROCEDURE — 97110 THERAPEUTIC EXERCISES: CPT

## 2022-08-24 NOTE — PROGRESS NOTES
PHYSICAL THERAPY - DAILY TREATMENT NOTE    Patient Name: Lamont Werner        Date: 2022  : 1970   YES Patient  Verified  Visit #:     Insurance: Payor: Lyly Perrin / Plan: [employer]ith[employer] / Product Type: Federal Funded Programs /      In time: 332 Out time: 950   Total Treatment Time: 60     BCBS/Medicare Time Tracking (below)   Total Timed Codes (min):  NA 1:1 Treatment Time:  NA     TREATMENT AREA =  Pain in left shoulder [M25.512]    SUBJECTIVE  Pain Level (on 0 to 10 scale):  2  / 10   Medication Changes/New allergies or changes in medical history, any new surgeries or procedures? 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 I have been doing my exercises consistently at home, but I still have a lot of stiffness. Modalities Rationale:     decrease edema, decrease inflammation, and decrease pain to improve patient's ability to perform pain-free ADLs.    15 min [x] Estim, type/location: IFC L shoulder                                     []  att     [x]  unatt     []  w/US     [x]  w/ice    []  w/heat    min []  Mechanical Traction: type/lbs                   []  pro   []  sup   []  int   []  cont    []  before manual    []  after manual    min []  Ultrasound, settings/location:      min []  Iontophoresis w/ dexamethasone, location:                                               []  take home patch       []  in clinic    min [x]  Ice     []  Heat    location/position:     min []  Vasopneumatic Device, press/temp:    If using vaso (only need to measure limb vaso being performed on)      pre-treatment girth :       post-treatment girth :       measured at (landmark location) :      min []  Other:    [x] Skin assessment post-treatment (if applicable):    [x]  intact    [x]  redness- no adverse reaction []redness - adverse reaction:        20 min Therapeutic Exercise:  [x]  See flow sheet   Rationale:      increase ROM, increase strength, and improve coordination to improve the patients ability to perform unlimited ADLs. 10 min Manual Therapy: PROM and gentle stretching into flexion, ER and abd, grade 2 AP mobs of GHJ    Rationale:      decrease pain, increase ROM, increase tissue extensibility, and decrease edema  to improve patient's ability to improve motion per protocol  The manual therapy interventions were performed at a separate and distinct time from the therapeutic activities interventions. 15 min Neuromuscular Re-ed: [x]  See flow sheet   Rationale:    increase strength, improve coordination, and increase proprioception to improve the patients ability to improve stability of L shoulder during OH motion    Billed With/As:   [] TE   [] TA   [] Neuro   [] Self Care Patient Education: [x] Review HEP    [] Progressed/Changed HEP based on:   [] positioning   [] body mechanics   [] transfers   [] heat/ice application    [] other:      Other Objective/Functional Measures: Added supine circles     Post Treatment Pain Level (on 0 to 10) scale:   1  / 10     ASSESSMENT  Assessment/Changes in Function:     Improved tolerance to AROM RC and scapular exercises today however pt continues to have pain with ABD shoulder ROM. Good tolerance to isometrics although pt fatigued quickly.     Reviewed home exercises that would be safe to perform in a pool; differing ways to improve mobility and prevent excessive resistance to LUE.      []  See Progress Note/Recertification   Patient will continue to benefit from skilled PT services to modify and progress therapeutic interventions, address functional mobility deficits, address ROM deficits, address strength deficits, analyze and address soft tissue restrictions, analyze and cue movement patterns, analyze and modify body mechanics/ergonomics, and assess and modify postural abnormalities to attain remaining goals. Progress toward goals / Updated goals:    Progressing towards STG 3.       PLAN  [x]  Upgrade activities as tolerated YES Continue plan of care   []  Discharge due to :    []  Other:      Therapist: Tomasa Meza DPT    Date: 8/24/2022 Time: 9:00 AM     Future Appointments   Date Time Provider Parker Del Angel   8/24/2022  8:45 AM Theora Hy, PT [facility]   8/25/2022  5:00 PM Theormelo Hy, PT [facility]   9/2/2022  3:30 PM Theora Hy, PT [facility]   9/6/2022  3:00 PM Theora Hy, PT [facility]   9/7/2022  8:00 AM Theormelo Servin, PT [facility]   9/21/2022  9:45 AM Alejandra Baltazar MD [facility] AMB

## 2022-08-25 ENCOUNTER — HOSPITAL ENCOUNTER (OUTPATIENT)
Dept: PHYSICAL THERAPY | Age: 52
Discharge: HOME | End: 2022-08-25
Payer: OTHER GOVERNMENT

## 2022-08-25 PROCEDURE — 97112 NEUROMUSCULAR REEDUCATION: CPT

## 2022-08-25 PROCEDURE — 97110 THERAPEUTIC EXERCISES: CPT

## 2022-08-25 PROCEDURE — 97014 ELECTRIC STIMULATION THERAPY: CPT

## 2022-08-25 PROCEDURE — 97140 MANUAL THERAPY 1/> REGIONS: CPT

## 2022-08-25 NOTE — PROGRESS NOTES
PHYSICAL THERAPY - DAILY TREATMENT NOTE    Patient Name: Nydia Blevins        Date: 2022  : 1970   YES Patient  Verified  Visit #:   12   of   15  Insurance: Payor: [facility] CCN / Plan: Tia Self / Product Type: [address] [employer] /      In time: 781 Out time: 605   Total Treatment Time: 60     BCBS/Medicare Time Tracking (below)   Total Timed Codes (min):  NA 1:1 Treatment Time:  NA     TREATMENT AREA =  Pain in left shoulder [M25.512]    SUBJECTIVE  Pain Level (on 0 to 10 scale):  2  / 10   Medication Changes/New allergies or changes in medical history, any new surgeries or procedures? NO    If yes, update Summary List   Subjective Functional Status/Changes:  []  No changes reported     Some soreness from yesterday's session, I mainly feel tight. Modalities Rationale:     decrease edema, decrease inflammation, and decrease pain to improve patient's ability to perform pain-free ADLs.    15 min [x] Estim, type/location: IFC L shoulder                                     []  att     [x]  unatt     []  w/US     [x]  w/ice    []  w/heat    min []  Mechanical Traction: type/lbs                   []  pro   []  sup   []  int   []  cont    []  before manual    []  after manual    min []  Ultrasound, settings/location:      min []  Iontophoresis w/ dexamethasone, location:                                               []  take home patch       []  in clinic    min [x]  Ice     []  Heat    location/position:     min []  Vasopneumatic Device, press/temp:    If using vaso (only need to measure limb vaso being performed on)      pre-treatment girth :       post-treatment girth :       measured at (landmark location) :      min []  Other:    [x] Skin assessment post-treatment (if applicable):    [x]  intact    [x]  redness- no adverse reaction                  []redness - adverse reaction:        20 min Therapeutic Exercise:  [x]  See flow sheet   Rationale:      increase ROM, increase strength, and improve coordination to improve the patients ability to perform unlimited ADLs. 15 min Manual Therapy: PROM and gentle stretching into flexion, ER and abd, grade 2 AP mobs of GHJ    Rationale:      decrease pain, increase ROM, increase tissue extensibility, and decrease edema  to improve patient's ability to improve motion per protocol  The manual therapy interventions were performed at a separate and distinct time from the therapeutic activities interventions. 10 min Neuromuscular Re-ed: [x]  See flow sheet   Rationale:    increase strength, improve coordination, and increase proprioception to improve the patients ability to improve stability of L shoulder during OH motion    Billed With/As:   [] TE   [] TA   [] Neuro   [] Self Care Patient Education: [x] Review HEP    [] Progressed/Changed HEP based on:   [] positioning   [] body mechanics   [] transfers   [] heat/ice application    [] other:      Other Objective/Functional Measures:    TE per FS    Resumed chair walk back stretch     Post Treatment Pain Level (on 0 to 10) scale:   1  / 10     ASSESSMENT  Assessment/Changes in Function:     Pt had some stiffness upon arrival today, improved with self and manual cues but pt had inc muscle guarding and reports of pain with manual stretches. Encouraged pt to take a rest day tomorrow and focus on stretching over the weekend as it is making slow progress; will be unable to progress to strengthening with ROM limitations this high. []  See Progress Note/Recertification   Patient will continue to benefit from skilled PT services to modify and progress therapeutic interventions, address functional mobility deficits, address ROM deficits, address strength deficits, analyze and address soft tissue restrictions, analyze and cue movement patterns, analyze and modify body mechanics/ergonomics, and assess and modify postural abnormalities to attain remaining goals.    Progress toward goals / Updated goals:    Progressing towards STG 3.       PLAN  [x]  Upgrade activities as tolerated YES Continue plan of care   []  Discharge due to :    []  Other:      Therapist: Ovidio Aden DPT    Date: 8/25/2022 Time: 9:00 AM     Future Appointments   Date Time Provider Parker Del Angel   9/2/2022  3:30 PM Julio Aguirre, PT [facility]   9/6/2022  3:00 PM Julio Aguirre, PT [facility]   9/7/2022  8:00 AM Julio Aguirre, PT [facility]   9/21/2022  9:45 AM Asif Calle MD [facility] AMB

## 2022-08-26 ENCOUNTER — APPOINTMENT (OUTPATIENT)
Dept: PHYSICAL THERAPY | Age: 52
End: 2022-08-26
Payer: OTHER GOVERNMENT

## 2022-08-29 ENCOUNTER — APPOINTMENT (OUTPATIENT)
Dept: PHYSICAL THERAPY | Age: 52
End: 2022-08-29
Payer: OTHER GOVERNMENT

## 2022-09-02 ENCOUNTER — HOSPITAL ENCOUNTER (OUTPATIENT)
Dept: PHYSICAL THERAPY | Age: 52
Discharge: HOME | End: 2022-09-02
Payer: OTHER GOVERNMENT

## 2022-09-02 ENCOUNTER — TELEPHONE (OUTPATIENT)
Dept: ORTHOPEDIC SURGERY | Age: 52
End: 2022-09-02

## 2022-09-02 DIAGNOSIS — M75.122 NONTRAUMATIC COMPLETE TEAR OF LEFT ROTATOR CUFF: Primary | ICD-10-CM

## 2022-09-02 PROCEDURE — 97140 MANUAL THERAPY 1/> REGIONS: CPT

## 2022-09-02 PROCEDURE — 97014 ELECTRIC STIMULATION THERAPY: CPT

## 2022-09-02 PROCEDURE — 97110 THERAPEUTIC EXERCISES: CPT

## 2022-09-02 NOTE — TELEPHONE ENCOUNTER
Patient called and said that Jaqueline Vargas had referred him to [facility] for the Left Shoulder. That his last session is on 09/07/2022. Patient said that the Physical Therapist recommended he have 15 more sessions of Therapy. Patient said that his insurance is [employer], and is hoping  could get the insurance referral for him to continue physical therapy, so that he will not miss a session. [facility] at the [facility], [facility] location tel. [phone]  Fax [phone]    Patient tel. [phone].

## 2022-09-02 NOTE — PROGRESS NOTES
PHYSICAL THERAPY - DAILY TREATMENT NOTE    Patient Name: Monet Love        Date: 2022  : 1970   YES Patient  Verified  Visit #:      15  Insurance: Payor: Jovon Raphael / Plan: Tiny Din / Product Type: Federal Funded Programs /      In time: 335 Out time: 435   Total Treatment Time: 60     BCBS/Medicare Time Tracking (below)   Total Timed Codes (min):  NA 1:1 Treatment Time:  NA     TREATMENT AREA =  Pain in left shoulder [M25.512]    SUBJECTIVE  Pain Level (on 0 to 10 scale):  1-2  / 10   Medication Changes/New allergies or changes in medical history, any new surgeries or procedures? NO    If yes, update Summary List   Subjective Functional Status/Changes:  []  No changes reported     I feel that my motion is not improving; I havent been as consistent with stretches because I am at work all the time         Modalities Rationale:     decrease edema, decrease inflammation, and decrease pain to improve patient's ability to perform pain-free ADLs.    15 min [x] Estim, type/location: IFC L shoulder                                     []  att     [x]  unatt     []  w/US     [x]  w/ice    []  w/heat    min []  Mechanical Traction: type/lbs                   []  pro   []  sup   []  int   []  cont    []  before manual    []  after manual    min []  Ultrasound, settings/location:      min []  Iontophoresis w/ dexamethasone, location:                                               []  take home patch       []  in clinic    min [x]  Ice     []  Heat    location/position:     min []  Vasopneumatic Device, press/temp:    If using vaso (only need to measure limb vaso being performed on)      pre-treatment girth :       post-treatment girth :       measured at (landmark location) :      min []  Other:    [x] Skin assessment post-treatment (if applicable):    [x]  intact    [x]  redness- no adverse reaction                  []redness - adverse reaction:        30 min Therapeutic Exercise:  [x]  See flow sheet   Rationale:      increase ROM, increase strength, and improve coordination to improve the patients ability to perform unlimited ADLs. 15 min Manual Therapy: PROM and gentle stretching into flexion, ER and abd, grade 2 AP mobs of GHJ    Rationale:      decrease pain, increase ROM, increase tissue extensibility, and decrease edema  to improve patient's ability to improve motion per protocol  The manual therapy interventions were performed at a separate and distinct time from the therapeutic activities interventions. - min Neuromuscular Re-ed: [x]  See flow sheet   Rationale:    increase strength, improve coordination, and increase proprioception to improve the patients ability to improve stability of L shoulder during OH motion    Billed With/As:   [] TE   [] TA   [] Neuro   [] Self Care Patient Education: [x] Review HEP    [] Progressed/Changed HEP based on:   [] positioning   [] body mechanics   [] transfers   [] heat/ice application    [] other:      Other Objective/Functional Measures: Focused on stretching today and help AROM exercises to dec inflammation     Post Treatment Pain Level (on 0 to 10) scale:   1  / 10     ASSESSMENT  Assessment/Changes in Function:     See PN     []  See Progress Note/Recertification   Patient will continue to benefit from skilled PT services to modify and progress therapeutic interventions, address functional mobility deficits, address ROM deficits, address strength deficits, analyze and address soft tissue restrictions, analyze and cue movement patterns, analyze and modify body mechanics/ergonomics, and assess and modify postural abnormalities to attain remaining goals.    Progress toward goals / Updated goals:    See PN     PLAN  [x]  Upgrade activities as tolerated YES Continue plan of care   []  Discharge due to :    []  Other:      Therapist: Yareli Whittington DPT    Date: 9/2/2022 Time: 9:00 AM     Future Appointments   Date Time Provider Parker Del Angel   9/6/2022  3:00 PM Thi Martinez, PT [facility]   9/7/2022  8:00 AM Thi Martinez, [facility]   9/21/2022  9:45 AM Suhail Begum MD [facility] BS AMB

## 2022-09-06 ENCOUNTER — APPOINTMENT (OUTPATIENT)
Dept: PHYSICAL THERAPY | Age: 52
End: 2022-09-06
Payer: OTHER GOVERNMENT

## 2022-09-06 NOTE — PROGRESS NOTES
[facility] AT [address] [place] [address] [address], [address] [place] [address], [address]  Phone: [phone]  Fax: [phone]  PROGRESS NOTE  Patient Name: Tina Vazquez : 1970   Treatment/Medical Diagnosis: Pain in left shoulder [M25.512]   Referral Source: Marti Moncada NP     Date of Initial Visit: 2022 Attended Visits: 13 Missed Visits: 5     SUMMARY OF TREATMENT  Therapeutic exercises including ROM, strengthening and stretching; manual therapy including joint and soft tissue manipulation; postural re-education, modalities: CP and e-stim, HEP instruction. CURRENT STATUS  Mr. Blanquita Brown is currently 14 weeks s/p L RCR and biceps tenodesis on 22. Progress has been very slow as pain levels are still consistently high following poor sling compliance after 1 week. Pt was beginning to make steady ROM progressions until missing 3 weeks of PT with covid and work. When pt returned, he presented with capsular pattern of restrictions and has been consistently showing dec motion and inc pain with AROM. 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 He has been educated on capsular stretches and importance of maintaining pain-free ROM in order to avoid inflammatory response. At this level of rehab, he is unable to tolerate strengthening or light resistance bands within available range. See below for objective measures:    Goal/Measure of Progress Goal Met? 1. Patient to be independent & compliant with a progressive, high level HEP in order to maintain gains made in physical therapy. Status at last Eval: - Current Status: Reports compliance  yes   2. Improve L shoulder PROM to [facility] so ROM in order to improve tolerance to AAROM>AROM. Status at last Eval: flex 140°,   [email or web address] abd 55°  Current Status: Flex 110 deg p! [email or web address] abd 30 deg   Regressing    3.   Improve FOTO score to L indicating significant functional improvement in order to return to PLOF to lift, carry and reach. Status at last Eval: 54 Current Status: TBA Progressing   4. Improve L shoulder AROM to [facility] so ROM is available for [facility] reaching and dressing. Status at last Eval: - Current Status: 80 deg elevation prior to UT compensations with ERP Progressing     New Goals to be achieved in __4__  weeks: AS ABOVE    RECOMMENDATIONS  Pt could benefit from re-evaluation in order to rule out additional pathology due to recent regression. Otherwise, continue with skilled PT services 2x/week for 4 weeks to progress to LTG's and return to PLOF. If you have any questions/comments please contact us directly at [phone]. Thank you for allowing us to assist in the care of your patient. Therapist Signature: Osei Kumar DPT Date: 9/6/2022     Time: 12:40 PM   NOTE TO PHYSICIAN:  PLEASE COMPLETE THE ORDERS BELOW AND FAX TO   [facility]: [phone]. If you are unable to process this request in 24 hours please contact our office: [phone].    ___ I have read the above report and request that my patient continue as recommended.   ___ I have read the above report and request that my patient continue therapy with the following changes/special instructions:_________________________________________________________   ___ I have read the above report and request that my patient be discharged from therapy.      Physician Signature:        Date:       Time:

## 2022-09-07 ENCOUNTER — HOSPITAL ENCOUNTER (OUTPATIENT)
Dept: PHYSICAL THERAPY | Age: 52
Discharge: HOME | End: 2022-09-07
Payer: OTHER GOVERNMENT

## 2022-09-07 PROCEDURE — 97110 THERAPEUTIC EXERCISES: CPT

## 2022-09-07 PROCEDURE — 97112 NEUROMUSCULAR REEDUCATION: CPT

## 2022-09-07 PROCEDURE — 97140 MANUAL THERAPY 1/> REGIONS: CPT

## 2022-09-07 PROCEDURE — 97014 ELECTRIC STIMULATION THERAPY: CPT

## 2022-09-07 NOTE — PROGRESS NOTES
PHYSICAL THERAPY - DAILY TREATMENT NOTE    Patient Name: Anderson Mode        Date: 2022  : 1970   YES Patient  Verified  Visit #:     Insurance: Payor: Dandy Gil / Plan: Kathy Fatima / Product Type: Federal Funded Programs /      In time: 430 Out time: 545   Total Treatment Time: 70     BCBS/Medicare Time Tracking (below)   Total Timed Codes (min):   1:1 Treatment Time:       TREATMENT AREA =  Pain in left shoulder [M25.512]    SUBJECTIVE  Pain Level (on 0 to 10 scale):  1-2  / 10   Medication Changes/New allergies or changes in medical history, any new surgeries or procedures?     NO    If yes, update Summary List   Subjective Functional Status/Changes:  []  No changes reported     Still tight and sleeping with a pillow under my arm          Modalities Rationale:     decrease inflammation, decrease pain and increase tissue extensibility to improve patient's ability to perform ADLs  15 min [x] Estim, type/location: L shoulder                                    []  att     [x]  unatt     []  w/US     [x]  w/ice    []  w/heat    min []  Mechanical Traction: type/lbs                   []  pro   []  sup   []  int   []  cont    []  before manual    []  after manual    min []  Ultrasound, settings/location:      min []  Iontophoresis w/ dexamethasone, location:                                               []  take home patch       []  in clinic    min []  Ice     []  Heat    location/position:     min []  Vasopneumatic Device, press/temp: If using vaso (only need to measure limb vaso being performed on)      pre-treatment girth :       post-treatment girth :       measured at (landmark location) :      min []  Other:    [x] Skin assessment post-treatment (if applicable):    [x]  intact    [x]  redness- no adverse reaction     []redness - adverse reaction:        30 min Therapeutic Exercise:  [x]  See flow sheet   Rationale:      increase ROM and increase strength to Thania Gutierrez [facility] -  Laly Burrows; Dr. Lopez  [address] [place] [id number]  Ph: [phone]    Schedule appt 3-4 weeks for follow up on BP and MWV    Labs on 11/9/21 check potassium   improve the patients ability to perform unlimted ADLs     15 min Manual Therapy: Technique:      [] S/DTM []IASTM []PROM  [] Passive Stretching  []manual TPR  []Jt manipulation:Gr I [] II []  III [] IV[] V[]  Treatment/Area:     Rationale:      decrease pain, increase ROM, increase tissue extensibility and decrease trigger points to improve patient's ability to perform ADLs  The manual therapy interventions were performed at a separate and distinct time from the therapeutic activities interventions. 10 min Neuromuscular Re-ed: [x]  See flow sheet   Rationale:    improve coordination, improve balance, and increase proprioception to improve the patients postural awareness, stability and motor control    Billed With/As:   [] TE   [] TA   [] Neuro   [] Self Care Patient Education: [x] Review HEP    [] Progressed/Changed HEP based on:   [] positioning   [] body mechanics   [] transfers   [] heat/ice application    [] other:      Other Objective/Functional Measures:    See FS     Post Treatment Pain Level (on 0 to 10) scale:   1  / 10     ASSESSMENT  Assessment/Changes in Function:     Progressed treatment as appropriate with good patient tolerance. ROM gains cont to be minimal     []  See Progress Note/Recertification   Patient will continue to benefit from skilled PT services to modify and progress therapeutic interventions, address functional mobility deficits, address ROM deficits, address strength deficits, analyze and address soft tissue restrictions, analyze and cue movement patterns, analyze and modify body mechanics/ergonomics, assess and modify postural abnormalities, address imbalance/dizziness and instruct in home and community integration to attain remaining goals.    Progress toward goals / Updated goals:    Slow progress towards ROM goals     PLAN  [x]  Upgrade activities as tolerated YES Continue plan of care   []  Discharge due to :    []  Other:      Therapist: Saji Cannon, PT, DPT, MTC, CMTPT Date: 9/7/2022 Time: 7:04 PM     Future Appointments   Date Time Provider Parker Del Angel   9/21/2022  9:45 AM Warden Litten Celso Babinski, MD [facility] BS AMB

## 2022-09-08 ENCOUNTER — APPOINTMENT (OUTPATIENT)
Dept: PHYSICAL THERAPY | Age: 52
End: 2022-09-08
Payer: OTHER GOVERNMENT

## 2022-09-14 ENCOUNTER — HOSPITAL ENCOUNTER (OUTPATIENT)
Dept: PHYSICAL THERAPY | Age: 52
Discharge: HOME | End: 2022-09-14
Payer: OTHER GOVERNMENT

## 2022-09-14 PROCEDURE — 97140 MANUAL THERAPY 1/> REGIONS: CPT

## 2022-09-14 PROCEDURE — 97110 THERAPEUTIC EXERCISES: CPT

## 2022-09-14 PROCEDURE — 97535 SELF CARE MNGMENT TRAINING: CPT

## 2022-09-14 NOTE — PROGRESS NOTES
PHYSICAL THERAPY - DAILY TREATMENT NOTE    Patient Name: Vanessa Vargas        Date: 2022  : 1970   YES Patient  Verified  Visit #:   15   of   15  Insurance: Payor: [facility] CCN / Plan: Angel Brandt / Product Type: Federal Funded Programs /      In time: 471 Out time: 181   Total Treatment Time: 60     BCBS/Medicare Time Tracking (below)   Total Timed Codes (min):  NA 1:1 Treatment Time:  NA     TREATMENT AREA =  Pain in left shoulder [M25.512]    SUBJECTIVE  Pain Level (on 0 to 10 scale):  1-2   10   Medication Changes/New allergies or changes in medical history, any new surgeries or procedures? NO    If yes, update Summary List   Subjective Functional Status/Changes:  []  No changes reported     Pt reports he is able to achieve stretch with capsular stretches without any pinching pain/discomfort. Modalities Rationale:     decrease edema, decrease inflammation, and decrease pain to improve patient's ability to perform pain-free ADLs.    - min [x] Estim, type/location: IFC L shoulder                                     []  att     [x]  unatt     []  w/US     [x]  w/ice    []  w/heat    min []  Mechanical Traction: type/lbs                   []  pro   []  sup   []  int   []  cont    []  before manual    []  after manual    min []  Ultrasound, settings/location:      min []  Iontophoresis w/ dexamethasone, location:                                               []  take home patch       []  in clinic   10 min [x]  Ice     []  Heat    location/position: L shoulder supine    min []  Vasopneumatic Device, press/temp:    If using vaso (only need to measure limb vaso being performed on)      pre-treatment girth :       post-treatment girth :       measured at (landmark location) :      min []  Other:    [x] Skin assessment post-treatment (if applicable):    [x]  intact    [x]  redness- no adverse reaction                  []redness - adverse reaction:        25 min Therapeutic Exercise:  [x]  See flow sheet   Rationale:      increase ROM, increase strength, and improve coordination to improve the patients ability to perform unlimited ADLs. 15 min Manual Therapy: PROM and gentle stretching into flexion, ER and abd, grade 2 AP mobs of GHJ    Rationale:      decrease pain, increase ROM, increase tissue extensibility, and decrease edema  to improve patient's ability to improve motion per protocol  The manual therapy interventions were performed at a separate and distinct time from the therapeutic activities interventions. - min Neuromuscular Re-ed: [x]  See flow sheet   Rationale:    increase strength, improve coordination, and increase proprioception to improve the patients ability to improve stability of L shoulder during OH motion    10 min Self Care: Discussed timing of stretches throughout the day and maintaining pain-free stretch in order to avoid inflammatory process. Rationale:    increase ROM, increase strength, improve coordination, and increase proprioception to improve the patients ability to improve Motion to progress towards strengthening    Billed With/As:   [] TE   [] TA   [] Neuro   [] Self Care Patient Education: [x] Review HEP    [] Progressed/Changed HEP based on:   [] positioning   [] body mechanics   [] transfers   [] heat/ice application    [] other:      Other Objective/Functional Measures:    Performed stretches before and after manual treatment    Added sleeper stretch with IR    Flex 135 deg, scap 145, ABD 90 deg with scap stabilized, ER 55 deg at 45 deg ABD     Post Treatment Pain Level (on 0 to 10) scale:   1  / 10     ASSESSMENT  Assessment/Changes in Function:     Improved tolerance to stretching today with minimal pain felt in anterior shoulder.       []  See Progress Note/Recertification   Patient will continue to benefit from skilled PT services to modify and progress therapeutic interventions, address functional mobility deficits, address ROM deficits, address strength deficits, analyze and address soft tissue restrictions, analyze and cue movement patterns, analyze and modify body mechanics/ergonomics, and assess and modify postural abnormalities to attain remaining goals.    Progress toward goals / Updated goals:    See PN     PLAN  [x]  Upgrade activities as tolerated YES Continue plan of care   []  Discharge due to :    []  Other:      Therapist: Shanice Zaidi DPT    Date: 9/14/2022 Time: 9:00 AM     Future Appointments   Date Time Provider Parker Del Angel   9/21/2022  9:45 AM Sanjuanita Guadarrama MD [facility]

## 2022-09-21 ENCOUNTER — OFFICE VISIT (OUTPATIENT)
Dept: ORTHOPEDIC SURGERY | Age: 52
End: 2022-09-21
Payer: OTHER GOVERNMENT

## 2022-09-21 VITALS
OXYGEN SATURATION: 98 % | HEIGHT: 66 IN | HEART RATE: 69 BPM | WEIGHT: 242 LBS | TEMPERATURE: 98 F | BODY MASS INDEX: 38.89 KG/M2

## 2022-09-21 DIAGNOSIS — M75.122 NONTRAUMATIC COMPLETE TEAR OF LEFT ROTATOR CUFF: Primary | ICD-10-CM

## 2022-09-21 PROCEDURE — 99213 OFFICE O/P EST LOW 20 MIN: CPT | Performed by: ORTHOPAEDIC SURGERY

## 2022-09-21 NOTE — PROGRESS NOTES
Patient: Lisa Escobar                MRN: [id number]       SSN: [id number]  YOB: 1970        AGE: 47 y.o. SEX: male  Body mass index is 39.06 kg/m². PCP: Budd Koyanagi, MD  09/21/22    Chief Complaint: Left shoulder follow-up    HPI: Lisa Escobar is a 47 y.o. male patient who returns to the office today about 4 months out from his left shoulder rotator cuff repair. He still has some slight stiffness and weakness. He is awaiting further physical therapy approval from the [facility]. Past Medical History:   Diagnosis Date    COVID-19 01/2022    GERD (gastroesophageal reflux disease)     Sleep apnea     no cpap       History reviewed. No pertinent family history. Current Outpatient Medications   Medication Sig Dispense Refill    cetirizine (ZYRTEC) 10 mg tablet Take 10 mg by mouth in the morning. ibuprofen (MOTRIN) 800 mg tablet       HYDROcodone-acetaminophen (NORCO) 5-325 mg per tablet       Advair -21 mcg/actuation inhaler Take 2 Puffs by inhalation two (2) times a day. senna-docusate (Colace 2-In-1) 8.6-50 mg per tablet Take 1 Tablet by mouth daily. Indications: constipation 30 Tablet 0    esomeprazole (NEXIUM) 20 mg capsule Take 20 mg by mouth daily. zolpidem (AMBIEN) 10 mg tablet Take 10 mg by mouth nightly as needed. No Known Allergies    Past Surgical History:   Procedure Laterality Date    HX ACL RECONSTRUCTION      HX HERNIA REPAIR      HX KNEE ARTHROSCOPY Right     HX ORCHIECTOMY  2006       Social History     Socioeconomic History    Marital status:      Spouse name: Not on file    Number of children: Not on file    Years of education: Not on file    Highest education level: Not on file   Occupational History    Not on file   Tobacco Use    Smoking status: Never    Smokeless tobacco: Never   Vaping Use    Vaping Use: Never used   Substance and Sexual Activity    Alcohol use:  Yes     Alcohol/week: 2.0 standard drinks     Types: 2 Cans of beer per week     Comment: occasional    Drug use: Never    Sexual activity: Not on file   Other Topics Concern    Not on file   Social History Narrative    Not on file     Social Determinants of Health     Financial Resource Strain: Not on file   Food Insecurity: Not on file   Transportation Needs: Not on file   Physical Activity: Not on file   Stress: Not on file   Social Connections: Not on file   Intimate Partner Violence: Not on file   Housing Stability: Not on file       REVIEW OF SYSTEMS:      No changes from previous review of systems unless noted. PHYSICAL EXAMINATION:  Visit Vitals  Pulse 69   Temp 98 °F (36.7 °C) (Temporal)   Ht 5' 6\" (1.676 m)   Wt 242 lb (109.8 kg)   SpO2 98%   BMI 39.06 kg/m²     Body mass index is 39.06 kg/m². GENERAL: Alert and oriented x3, in no acute distress. HEENT: Normocephalic, atraumatic. Left shoulder has stiffness with forward flexion past about 130 degrees. Also some slight stiffness with external rotation. He also has pain with supraspinatus rotator cuff testing. IMAGING:  Imaging read by myself and interpreted as follows:      ASSESSMENT & PLAN  Diagnosis: 4-month status post left shoulder arthroscopic rotator cuff repair    Irene Smith is now 4 months out from his surgery. He does have some stiffness that he is continue to work through. I recommend at this point continue with physical therapy and have ordered more physical therapy to work on strengthening as well as range of motion. He should also continue to work on it at home. Follow-up in 6 to 8 weeks. Prescription medication management discussed with patient. Electronically signed by: Jesika Arreguin MD    Note: This note was completed using voice recognition software.   Any typographical/name errors or mistakes are unintentional.

## 2022-11-02 ENCOUNTER — OFFICE VISIT (OUTPATIENT)
Dept: ORTHOPEDIC SURGERY | Age: 52
End: 2022-11-02
Payer: OTHER GOVERNMENT

## 2022-11-02 VITALS — BODY MASS INDEX: 38.89 KG/M2 | HEIGHT: 66 IN | WEIGHT: 242 LBS | RESPIRATION RATE: 16 BRPM

## 2022-11-02 DIAGNOSIS — M75.122 NONTRAUMATIC COMPLETE TEAR OF LEFT ROTATOR CUFF: Primary | ICD-10-CM

## 2022-11-02 DIAGNOSIS — S46.102D INJURY OF TENDON OF LONG HEAD OF BICEPS, LEFT, SUBSEQUENT ENCOUNTER: ICD-10-CM

## 2022-11-02 PROCEDURE — 99213 OFFICE O/P EST LOW 20 MIN: CPT | Performed by: ORTHOPAEDIC SURGERY

## 2022-11-02 NOTE — PROGRESS NOTES
Patient: Ines Mcclelland                MRN: [id number]       SSN: [id number]  YOB: 1970        AGE: 47 y.o. SEX: male  Body mass index is 39.06 kg/m². PCP: Cheyenne Hirsch MD  11/02/22    Chief Complaint: Left shoulder follow up     HPI: Ines Mcclelland is a 47 y.o. male patient who is now about 5 months out from his left shoulder surgery. He continues to see improvement in his range of motion and pain. Past Medical History:   Diagnosis Date    COVID-19 01/2022    GERD (gastroesophageal reflux disease)     Sleep apnea     no cpap       History reviewed. No pertinent family history. Current Outpatient Medications   Medication Sig Dispense Refill    cetirizine (ZYRTEC) 10 mg tablet Take 10 mg by mouth in the morning. ibuprofen (MOTRIN) 800 mg tablet       HYDROcodone-acetaminophen (NORCO) 5-325 mg per tablet       Advair -21 mcg/actuation inhaler Take 2 Puffs by inhalation two (2) times a day. senna-docusate (Colace 2-In-1) 8.6-50 mg per tablet Take 1 Tablet by mouth daily. Indications: constipation 30 Tablet 0    esomeprazole (NEXIUM) 20 mg capsule Take 20 mg by mouth daily. zolpidem (AMBIEN) 10 mg tablet Take 10 mg by mouth nightly as needed. No Known Allergies    Past Surgical History:   Procedure Laterality Date    HX ACL RECONSTRUCTION      HX HERNIA REPAIR      HX KNEE ARTHROSCOPY Right     HX ORCHIECTOMY  2006       Social History     Socioeconomic History    Marital status:      Spouse name: Not on file    Number of children: Not on file    Years of education: Not on file    Highest education level: Not on file   Occupational History    Not on file   Tobacco Use    Smoking status: Never    Smokeless tobacco: Never   Vaping Use    Vaping Use: Never used   Substance and Sexual Activity    Alcohol use:  Yes     Alcohol/week: 2.0 standard drinks     Types: 2 Cans of beer per week     Comment: occasional    Drug use: Never    Sexual activity: Not on file   Other Topics Concern    Not on file   Social History Narrative    Not on file     Social Determinants of Health     Financial Resource Strain: Not on file   Food Insecurity: Not on file   Transportation Needs: Not on file   Physical Activity: Not on file   Stress: Not on file   Social Connections: Not on file   Intimate Partner Violence: Not on file   Housing Stability: Not on file       REVIEW OF SYSTEMS:      No changes from previous review of systems unless noted. PHYSICAL EXAMINATION:  Visit Vitals  Resp 16   Ht 5' 6\" (1.676 m)   Wt 242 lb (109.8 kg)   BMI 39.06 kg/m²     Body mass index is 39.06 kg/m². GENERAL: Alert and oriented x3, in no acute distress. HEENT: Normocephalic, atraumatic. Left shoulder has stiffness in forward flexion of about 130 degrees. Rotator cuff strength testing is not really obvious weakness or pain. IMAGING:  Imaging read by myself and interpreted as follows:      ASSESSMENT & PLAN  Diagnosis: 5 months status post left shoulder arthroscopic rotator cuff repair and biceps tenodesis    Lum Brace continues to see improvements. He will continue with physical therapy. I told him full recovery is 9 to 12 months. Follow-up in 2 to 3 months. Prescription medication management discussed with patient. Electronically signed by: Abad Carey MD    Note: This note was completed using voice recognition software.   Any typographical/name errors or mistakes are unintentional.

## 2022-11-17 ENCOUNTER — HOSPITAL ENCOUNTER (OUTPATIENT)
Dept: PHYSICAL THERAPY | Age: 52
Discharge: HOME | End: 2022-11-17
Payer: OTHER GOVERNMENT

## 2022-11-17 PROCEDURE — 97110 THERAPEUTIC EXERCISES: CPT

## 2022-11-17 PROCEDURE — 97140 MANUAL THERAPY 1/> REGIONS: CPT

## 2022-11-17 NOTE — PROGRESS NOTES
PHYSICAL THERAPY - DAILY TREATMENT NOTE    Patient Name: Mark Rogers        Date: 2022  : 1970   YES Patient  Verified  Visit #:     Insurance: Payor: Pooja Kingsley / Plan: Bethanie Gaffney / Product Type: Federal Funded Programs /      In time: [id number] Out time:    Total Treatment Time: 45     BCBS/Medicare Time Tracking (below)   Total Timed Codes (min):   1:1 Treatment Time:       TREATMENT AREA =  Pain in left shoulder [M25.512]    SUBJECTIVE  Pain Level (on 0 to 10 scale):  1  / 10   Medication Changes/New allergies or changes in medical history, any new surgeries or procedures? NO    If yes, update Summary List   Subjective Functional Status/Changes:  []  No changes reported     The surgeon said sometimes this surgery takes longer to heal. My shoulder is very tight. I stretch a lot and still have to use a pillow under my arm sleeping in the recliner.         Modalities Rationale:     decrease inflammation, decrease pain and increase tissue extensibility to improve patient's ability to perform ADLs  TC min [] Estim, type/location:                                     []  att     []  unatt     []  w/US     []  w/ice    []  w/heat    min []  Mechanical Traction: type/lbs                   []  pro   []  sup   []  int   []  cont    []  before manual    []  after manual    min []  Ultrasound, settings/location:      min []  Iontophoresis w/ dexamethasone, location:                                               []  take home patch       []  in clinic    min []  Ice     []  Heat    location/position:     min []  Vasopneumatic Device, press/temp: If using vaso (only need to measure limb vaso being performed on)      pre-treatment girth :       post-treatment girth :       measured at (landmark location) :      min []  Other:    [x] Skin assessment post-treatment (if applicable):    [x]  intact    [x]  redness- no adverse reaction     []redness - adverse reaction:        30 min Therapeutic Exercise:  [x]  See flow sheet   Rationale:      increase ROM and increase strength to improve the patients ability to perform unlimted ADLs     15 min Manual Therapy: Technique:      [] S/DTM []IASTM [x]PROM  [] Passive Stretching  []manual TPR  []Jt manipulation:Gr I [] II []  III [] IV[] V[]  Treatment/Area:  PROM into all planes, CR into ER   Rationale:      decrease pain, increase ROM, increase tissue extensibility and decrease trigger points to improve patient's ability to perform ADLs  The manual therapy interventions were performed at a separate and distinct time from the therapeutic activities interventions. Billed With/As:   [x] TE   [] TA   [] Neuro   [] Self Care Patient Education: [x] Review HEP    [] Progressed/Changed HEP based on:   [] positioning   [] body mechanics   [] transfers   [] heat/ice application    [x] other: sleeper stretch     Other Objective/Functional Measures:    PROM flex 138, ER @45 50, IR @45 40     Post Treatment Pain Level (on 0 to 10) scale:   1  / 10     ASSESSMENT  Assessment/Changes in Function:     See PN     []  See Progress Note/Recertification   Patient will continue to benefit from skilled PT services to modify and progress therapeutic interventions, address functional mobility deficits, address ROM deficits, address strength deficits, analyze and address soft tissue restrictions, analyze and cue movement patterns, analyze and modify body mechanics/ergonomics, assess and modify postural abnormalities, address imbalance/dizziness and instruct in home and community integration to attain remaining goals.    Progress toward goals / Updated goals:    See PN     PLAN  [x]  Upgrade activities as tolerated YES Continue plan of care   []  Discharge due to :    []  Other:      Therapist: Steffi Phipps, PT, DPT, MTC, CMTPT    Date: 11/17/2022 Time: 11:30 AM     Future Appointments   Date Time Provider Parker Del Angel   1/4/2023  2:30 PM Henry Knight MD [facility] AMB

## 2022-12-21 NOTE — PROGRESS NOTES
[facility] PHYSICAL THERAPY  [address] [facility], [address] [place] [address] Road -   Phone: [phone]  Fax: [phone]  [x]  PROGRESS NOTE  []   [facility] SUMMARY  Patient Name: Layton Pulido : 1970   Treatment Diagnosis: Pain in left shoulder [M25.512]     Referral Source: Oscar Dennison NP     Date of Initial Visit: 22 Attended Visits: 16 Missed Visits:      SUMMARY OF TREATMENT  Therapeutic exercises including ROM, strengthening and stretching; manual therapy including joint and soft tissue manipulation; neuromuscular re-education, therapeutic activity, postural re-education, modalities: CP and e-stim, HEP instruction. CURRENT STATUS  Patient is a 47 y.o. male who presents to [facility] at [facility] S/P L RCR and biceps tenodesis on 22. He has returned to PT today after 2 month hold (last seen on 22) due to insurance authorization complications. He notes partial compliance with HEP and reports significant tightness throughout shoulder and continued need to use pillows and support shoulder in neutral when sleeping. PROM remains limited and is as follows: flex 138°, [email or web address] 50°, [email or web address] 40°. New Goals to be achieved in __3-4__  weeks:  1. Patient to be independent & compliant with a progressive, high level HEP in order to maintain gains made in physical therapy. 2.  Improve FOTO score to 75 indicating significant functional improvement in order to return to OF to lift, carry and reach. 3.  Improve L shoulder AROM to [facility] so ROM is available for [facility] reaching and dressing. RECOMMENDATIONS  Continue therapy per initial plan/protocol    Specifics: The patient could benefit from continued PT 2-3x/week for 3-4 weeks to progress towards achieving all LTGs. If you have any questions/comments please contact us directly at [phone]. Thank you for allowing us to assist in the care of your patient.     Therapist Signature: January Perera Renay Brown, PT, DPT, MTC, CMTPT Date: 11/17/2022   Reporting Period: NA Time: 2:12 PM   NOTE TO PHYSICIAN:  PLEASE COMPLETE THE ORDERS BELOW AND FAX TO   [facility]: [phone]  If you are unable to process this request in 24 hours please contact our office: [phone]    ___ I have read the above report and request that my patient continue as recommended.   ___ I have read the above report and request that my patient continue therapy with the following changes/special instructions:_________________________________________________________   ___ I have read the above report and request that my patient be discharged from therapy.      Physician Signature:        Date:       Time:         Nata Schafer NP

## 2022-12-23 ENCOUNTER — HOSPITAL ENCOUNTER (OUTPATIENT)
Dept: PHYSICAL THERAPY | Age: 52
Discharge: HOME | End: 2022-12-23
Payer: OTHER GOVERNMENT

## 2022-12-23 PROCEDURE — 97530 THERAPEUTIC ACTIVITIES: CPT

## 2022-12-23 PROCEDURE — 97140 MANUAL THERAPY 1/> REGIONS: CPT

## 2022-12-23 PROCEDURE — 97110 THERAPEUTIC EXERCISES: CPT

## 2022-12-23 NOTE — PROGRESS NOTES
[facility] PHYSICAL THERAPY AT [address] [place] [address] [address], [address] [place] [address], [address]  Phone: [phone]  Fax: [phone]  PROGRESS NOTE  Patient Name: Nicole Valdez : 1970   Treatment/Medical Diagnosis: Pain in left shoulder [M25.512]   Referral Source: Saira Wong NP     Date of Initial Visit: 2022 Attended Visits: 17 Missed Visits: 5     SUMMARY OF TREATMENT  Therapeutic exercises including ROM, strengthening and stretching; manual therapy including joint and soft tissue manipulation; neuromuscular re-education, therapeutic activity, postural re-education, modalities: CP and e-stim, HEP instruction. CURRENT STATUS  Patient is a 47 y.o. male who presents to [facility] at [facility] S/P L RCR and biceps tenodesis on 22. Pt has had multiple breaks in therapy due to insurance authorization and poor attendance. Pt was last seen  when most recent PN was written. Pt states he was very busy with work in the past month but has been performing HEP 2-3x/week and is swimming the side stroke (with RUE overhead) reporting no residual soreness. Pt continues to have stiffness with OH motion but is showing improvements in IR/ER motion and tolerance to stretching. He was once again educated on importance of compliance with PT program in order to maximize improvements following surgery. See below for objective measures:     Goal/Measure of Progress Goal Met? 1. Patient to be independent & compliant with a progressive, high level HEP in order to maintain gains made in physical therapy. Status at last Eval: - Current Status: Independent and compliant 2-3x/week yes   2. Improve FOTO score to 75 indicating significant functional improvement in order to return to OF to lift, carry and reach. Status at last Eval: 55 Current Status: 59 Progressing    3. Improve L shoulder AROM to [facility] so ROM is available for [facility] reaching and dressing. Status at last Eval: - Current Status: Flex to approx 95 deg prior to UT compensations yes   4. Improve PROM to full and pain-free in order to tolerate weighted exercises without symptoms. Status at last Eval: Flex 138 deg  ER at 45 deg 50 deg  IR at 45 deg 40 deg Current Status: Flex 138 deg  ER at 45 deg 55 deg  IR at 45 deg 45 deg Progressing     New Goals to be achieved in __4__  weeks: AS ABOVE    RECOMMENDATIONS  Continue with skilled PT services 2x/weeks for 4 weeks to improve overhead motion and return to recreational activity. Thank you! If you have any questions/comments please contact us directly at [phone]. Thank you for allowing us to assist in the care of your patient. Therapist Signature: Renetta Matias DPT Date: 12/23/2022     Time: 9:47 AM   NOTE TO PHYSICIAN:  PLEASE COMPLETE THE ORDERS BELOW AND FAX TO   [facility]: [phone]. If you are unable to process this request in 24 hours please contact our office: [phone].    ___ I have read the above report and request that my patient continue as recommended.   ___ I have read the above report and request that my patient continue therapy with the following changes/special instructions:_________________________________________________________   ___ I have read the above report and request that my patient be discharged from therapy.      Physician Signature:        Date:       Time:

## 2022-12-23 NOTE — PROGRESS NOTES
PHYSICAL THERAPY - DAILY TREATMENT NOTE    Patient Name: Hamzah Rosario        Date: 2022  : 1970   YES Patient  Verified  Visit #:     Insurance: Payor: Axel Benton / Plan: Norman Duty / Product Type: Federal Funded Programs /      In time: 949 Out time: 3069   Total Treatment Time: 60     BCBS/Medicare Time Tracking (below)   Total Timed Codes (min):  NA 1:1 Treatment Time:  NA     TREATMENT AREA =  Pain in left shoulder [M25.512]    SUBJECTIVE  Pain Level (on 0 to 10 scale):  1  / 10   Medication Changes/New allergies or changes in medical history, any new surgeries or procedures? NO    If yes, update Summary List   Subjective Functional Status/Changes:  []  No changes reported     Pt reports he has been swimming the side stroke with RUE overhead and CARLOS infront of him          Modalities Rationale:     decrease edema, decrease inflammation, and decrease pain to improve patient's ability to perform pain-free ADLs.     min [] Estim, type/location:                                      []  att     []  unatt     []  w/US     []  w/ice    []  w/heat    min []  Mechanical Traction: type/lbs                   []  pro   []  sup   []  int   []  cont    []  before manual    []  after manual    min []  Ultrasound, settings/location:      min []  Iontophoresis w/ dexamethasone, location:                                               []  take home patch       []  in clinic   10 min [x]  Ice     []  Heat    location/position: L shoulder supine    min []  Vasopneumatic Device, press/temp:    If using vaso (only need to measure limb vaso being performed on)      pre-treatment girth :       post-treatment girth :       measured at (landmark location) :      min []  Other:    [x] Skin assessment post-treatment (if applicable):    [x]  intact    [x]  redness- no adverse reaction                  []redness - adverse reaction:        25 min Therapeutic Exercise:  [x]  See flow sheet Rationale:      increase ROM, increase strength, improve coordination, and increase proprioception to improve the patients ability to perform unlimited ADLs. 15 min Manual Therapy: PROM and stretching into flex, ER and IR, grade 3-4 AP mobs to GHJ   Rationale:      decrease pain, increase ROM, increase tissue extensibility, and decrease edema  to improve patient's ability to improve OH motion  The manual therapy interventions were performed at a separate and distinct time from the therapeutic activities interventions. 10 min Therapeutic Activity: [x]  See flow sheet   Rationale:    increase ROM, increase strength, improve coordination, and increase proprioception to improve the patients ability to resume OH motion      Billed With/As:   [x] TE   [] TA   [] Neuro   [] Self Care Patient Education: [x] Review HEP    [] Progressed/Changed HEP based on:   [] positioning   [] body mechanics   [] transfers   [] heat/ice application    [] other:      Other Objective/Functional Measures: Added Tband row, Tband IR/ER to neutral     Post Treatment Pain Level (on 0 to 10) scale:   1  / 10     ASSESSMENT  Assessment/Changes in Function:     See PN     []  See Progress Note/Recertification   Patient will continue to benefit from skilled PT services to modify and progress therapeutic interventions, address functional mobility deficits, address ROM deficits, address strength deficits, analyze and address soft tissue restrictions, analyze and cue movement patterns, analyze and modify body mechanics/ergonomics, and assess and modify postural abnormalities to attain remaining goals. Progress toward goals / Updated goals:    See PN to MD for progress towards goals.      PLAN  [x]  Upgrade activities as tolerated YES Continue plan of care   []  Discharge due to :    []  Other:      Therapist: Amanda Carias DPT    Date: 12/23/2022 Time: 2:01 PM     Future Appointments   Date Time Provider Parker Del Angel   12/28/2022 5:20 PM Sandip Colmenares, PT [facility]   1/4/2023  2:30 PM Daquan Pendleton MD [facility]   1/4/2023  5:00 PM Sandip Colmenares, PT [facility]   1/6/2023  3:30 PM Gonzales Choudhury, PT [facility]   1/11/2023  5:00 PM Sandip Colmenares, PT [facility]   1/12/2023  4:00 PM Gonzales Choudhury, PT [facility]   1/18/2023  5:00 PM Sandip Colmenares, PT [facility]   1/19/2023  4:00 PM Gonzales Choudhury, PT [facility]

## 2022-12-28 ENCOUNTER — HOSPITAL ENCOUNTER (OUTPATIENT)
Dept: PHYSICAL THERAPY | Age: 52
Discharge: HOME | End: 2022-12-28
Payer: OTHER GOVERNMENT

## 2022-12-28 PROCEDURE — 97110 THERAPEUTIC EXERCISES: CPT

## 2022-12-28 PROCEDURE — 97140 MANUAL THERAPY 1/> REGIONS: CPT

## 2022-12-28 NOTE — PROGRESS NOTES
PHYSICAL THERAPY - DAILY TREATMENT NOTE    Patient Name: Precious Luque        Date: 2022  : 1970   YES Patient  Verified  Visit #:     Insurance: Payor: Remedios Lawton / Plan: Reginald Soto / Product Type: Federal Funded Programs /      In time: 751 Out time: 625   Total Treatment Time: 55     BCBS/Medicare Time Tracking (below)   Total Timed Codes (min):   1:1 Treatment Time:       TREATMENT AREA =  Pain in left shoulder [M25.512]    SUBJECTIVE  Pain Level (on 0 to 10 scale):  0  / 10   Medication Changes/New allergies or changes in medical history, any new surgeries or procedures?     NO    If yes, update Summary List   Subjective Functional Status/Changes:  []  No changes reported     I stretch in the sauna and realized if I stretch first, the swimming is easier       Modalities Rationale:     decrease inflammation, decrease pain and increase tissue extensibility to improve patient's ability to perform ADLs   min [] Estim, type/location:                                     []  att     []  unatt     []  w/US     []  w/ice    []  w/heat    min []  Mechanical Traction: type/lbs                   []  pro   []  sup   []  int   []  cont    []  before manual    []  after manual    min []  Ultrasound, settings/location:      min []  Iontophoresis w/ dexamethasone, location:                                               []  take home patch       []  in clinic   10 min [x]  Ice     []  Heat    location/position: L shoulder in sitting    min []  Vasopneumatic Device, press/temp: If using vaso (only need to measure limb vaso being performed on)      pre-treatment girth :       post-treatment girth :       measured at (landmark location) :      min []  Other:    [x] Skin assessment post-treatment (if applicable):    [x]  intact    [x]  redness- no adverse reaction     []redness - adverse reaction:        30 min Therapeutic Exercise:  [x]  See flow sheet   Rationale:      increase ROM and increase strength to improve the patients ability to perform unlimted ADLs     15 min Manual Therapy: Technique:      [] S/DTM []IASTM [x]PROM  [] Passive Stretching  []manual TPR  []Jt manipulation:Gr I [] II []  III [] IV[] V[]  Treatment/Area:  GHJ post glide, PROM into all planes, CR into ER   Rationale:      decrease pain, increase ROM, increase tissue extensibility and decrease trigger points to improve patient's ability to perform ADLs  The manual therapy interventions were performed at a separate and distinct time from the therapeutic activities interventions. Billed With/As:   [x] TE   [] TA   [] Neuro   [] Self Care Patient Education: [x] Review HEP    [] Progressed/Changed HEP based on:   [] positioning   [] body mechanics   [] transfers   [] heat/ice application    [x] other: Issued written HEP and reviewed     Other Objective/Functional Measures:    See FS     Post Treatment Pain Level (on 0 to 10) scale:   1  / 10     ASSESSMENT  Assessment/Changes in Function:     Pt able to verbalize good understanding of importance of capsular stretching to improve ROM     []  See Progress Note/Recertification   Patient will continue to benefit from skilled PT services to modify and progress therapeutic interventions, address functional mobility deficits, address ROM deficits, address strength deficits, analyze and address soft tissue restrictions, analyze and cue movement patterns, analyze and modify body mechanics/ergonomics, assess and modify postural abnormalities, address imbalance/dizziness and instruct in home and community integration to attain remaining goals.    Progress toward goals / Updated goals:    Slow progress towards ROM goals     PLAN  [x]  Upgrade activities as tolerated YES Continue plan of care   []  Discharge due to :    []  Other:      Therapist: Devra Alpers, PT, DPT, MTC, CMTPT    Date: 12/28/2022 Time: 11:30 AM     Future Appointments   Date Time Provider Parker Del Angel 1/4/2023  2:30 PM Cecelia Ruiz MD [facility]   1/4/2023  5:00 PM Mile Grace, PT [facility]   1/6/2023  3:30 PM Lenin Zhang, PT [facility]   1/11/2023  5:00 PM Mile Grace, PT [facility]   1/12/2023  4:00 PM Lenin Zhang, PT [facility]   1/18/2023  5:00 PM Mile Grace, PT [facility]   1/19/2023  4:00 PM Lenin Zhang PT [facility]

## 2023-01-04 ENCOUNTER — OFFICE VISIT (OUTPATIENT)
Dept: ORTHOPEDIC SURGERY | Age: 53
End: 2023-01-04
Payer: OTHER GOVERNMENT

## 2023-01-04 ENCOUNTER — HOSPITAL ENCOUNTER (OUTPATIENT)
Dept: PHYSICAL THERAPY | Age: 53
Discharge: HOME | End: 2023-01-04
Payer: OTHER GOVERNMENT

## 2023-01-04 VITALS — RESPIRATION RATE: 18 BRPM | BODY MASS INDEX: 39.06 KG/M2 | OXYGEN SATURATION: 97 % | HEIGHT: 66 IN | HEART RATE: 76 BPM

## 2023-01-04 DIAGNOSIS — M75.122 NONTRAUMATIC COMPLETE TEAR OF LEFT ROTATOR CUFF: Primary | ICD-10-CM

## 2023-01-04 PROCEDURE — 97140 MANUAL THERAPY 1/> REGIONS: CPT

## 2023-01-04 PROCEDURE — 97014 ELECTRIC STIMULATION THERAPY: CPT

## 2023-01-04 PROCEDURE — 97110 THERAPEUTIC EXERCISES: CPT

## 2023-01-04 PROCEDURE — 97112 NEUROMUSCULAR REEDUCATION: CPT

## 2023-01-04 PROCEDURE — 99213 OFFICE O/P EST LOW 20 MIN: CPT | Performed by: ORTHOPAEDIC SURGERY

## 2023-01-04 NOTE — PROGRESS NOTES
Patient: Marlee Hill                MRN: [id number]       SSN: [id number]  YOB: 1970        AGE: 47 y.o. SEX: male  Body mass index is 39.06 kg/m². PCP: Daniel Remy MD  01/04/23    Chief Complaint: Left shoulder follow up    HPI: Marlee Hill is a 47 y.o. male patient who returns today for his left shoulder. He is now about 8 months out from his left shoulder arthroscopic rotator cuff repair. Overall he is doing well. He notes occasional pain. He still in therapy. Past Medical History:   Diagnosis Date    COVID-19 01/2022    GERD (gastroesophageal reflux disease)     Sleep apnea     no cpap       History reviewed. No pertinent family history. Current Outpatient Medications   Medication Sig Dispense Refill    cetirizine (ZYRTEC) 10 mg tablet Take 10 mg by mouth in the morning. ibuprofen (MOTRIN) 800 mg tablet       HYDROcodone-acetaminophen (NORCO) 5-325 mg per tablet       Advair -21 mcg/actuation inhaler Take 2 Puffs by inhalation two (2) times a day. senna-docusate (Colace 2-In-1) 8.6-50 mg per tablet Take 1 Tablet by mouth daily. Indications: constipation 30 Tablet 0    esomeprazole (NEXIUM) 20 mg capsule Take 20 mg by mouth daily. zolpidem (AMBIEN) 10 mg tablet Take 10 mg by mouth nightly as needed. No Known Allergies    Past Surgical History:   Procedure Laterality Date    HX ACL RECONSTRUCTION      HX HERNIA REPAIR      HX KNEE ARTHROSCOPY Right     HX ORCHIECTOMY  2006       Social History     Socioeconomic History    Marital status:      Spouse name: Not on file    Number of children: Not on file    Years of education: Not on file    Highest education level: Not on file   Occupational History    Not on file   Tobacco Use    Smoking status: Never    Smokeless tobacco: Never   Vaping Use    Vaping Use: Never used   Substance and Sexual Activity    Alcohol use:  Yes     Alcohol/week: 2.0 standard drinks     Types: 2 Cans of beer per week     Comment: occasional    Drug use: Never    Sexual activity: Not on file   Other Topics Concern    Not on file   Social History Narrative    Not on file     Social Determinants of Health     Financial Resource Strain: Not on file   Food Insecurity: Not on file   Transportation Needs: Not on file   Physical Activity: Not on file   Stress: Not on file   Social Connections: Not on file   Intimate Partner Violence: Not on file   Housing Stability: Not on file       REVIEW OF SYSTEMS:      No changes from previous review of systems unless noted. PHYSICAL EXAMINATION:  Visit Vitals  Pulse 76   Resp 18   Ht 5' 6\" (1.676 m)   SpO2 97%   BMI 39.06 kg/m²     Body mass index is 39.06 kg/m². GENERAL: Alert and oriented x3, in no acute distress. HEENT: Normocephalic, atraumatic. Left shoulder has near full range of motion. Good strength. No obvious weakness or pain with rotator cuff testing. IMAGING:  Imaging read by myself and interpreted as follows:      ASSESSMENT & PLAN  Diagnosis: Status post left shoulder arthroscopic rotator cuff repair, 8 months    Mary Azul is doing well. At this point I have released him to return to all activities as tolerated. It has been a pleasure treating him. I will see him back as needed. Prescription medication management discussed with patient. Electronically signed by: Amy Allan MD    Note: This note was completed using voice recognition software.   Any typographical/name errors or mistakes are unintentional.

## 2023-01-05 NOTE — PROGRESS NOTES
PHYSICAL THERAPY - DAILY TREATMENT NOTE    Patient Name: Chitra Garza        Date: 2023  : 1970   YES Patient  Verified  Visit #:     Insurance: Payor: Solomon Puri / Plan: Francisco Javier Giron / Product Type: Federal Funded Programs /      In time: 500 Out time: 610   Total Treatment Time: 65     BCBS/Medicare Time Tracking (below)   Total Timed Codes (min):   1:1 Treatment Time:       TREATMENT AREA =  Pain in left shoulder [M25.512]    SUBJECTIVE  Pain Level (on 0 to 10 scale):  0  / 10   Medication Changes/New allergies or changes in medical history, any new surgeries or procedures?     NO    If yes, update Summary List   Subjective Functional Status/Changes:  []  No changes reported     Dr. Amberly Washington said I'm doing well, maybe just a little behind and to keep going to PT       Modalities Rationale:     decrease inflammation, decrease pain and increase tissue extensibility to improve patient's ability to perform ADLs  15 min [x] Estim, type/location: IFC to R shoulder in supine                                    []  att     [x]  unatt     []  w/US     [x]  w/ice    []  w/heat    min []  Mechanical Traction: type/lbs                   []  pro   []  sup   []  int   []  cont    []  before manual    []  after manual    min []  Ultrasound, settings/location:      min []  Iontophoresis w/ dexamethasone, location:                                               []  take home patch       []  in clinic    min []  Ice     []  Heat    location/position:     min []  Vasopneumatic Device, press/temp: If using vaso (only need to measure limb vaso being performed on)      pre-treatment girth :       post-treatment girth :       measured at (landmark location) :      min []  Other:    [x] Skin assessment post-treatment (if applicable):    [x]  intact    [x]  redness- no adverse reaction     []redness - adverse reaction:        20 min Therapeutic Exercise:  [x]  See flow sheet   Rationale: increase ROM and increase strength to improve the patients ability to perform unlimted ADLs     15 min Manual Therapy: Technique:      [] S/DTM []IASTM [x]PROM  [] Passive Stretching  []manual TPR  []Jt manipulation:Gr I [] II []  III [] IV[] V[]  Treatment/Area:  GHJ post glide, PROM into all planes, CR into ER   Rationale:      decrease pain, increase ROM, increase tissue extensibility and decrease trigger points to improve patient's ability to perform ADLs  The manual therapy interventions were performed at a separate and distinct time from the therapeutic activities interventions. 15 min Neuromuscular Re-ed: [x]  See flow sheet   Rationale:    increase strength and improve coordination to improve the patients ability to carry, and improve motor control    Billed With/As:   [x] TE   [] TA   [] Neuro   [] Self Care Patient Education: [x] Review HEP    [] Progressed/Changed HEP based on:   [] positioning   [] body mechanics   [] transfers   [] heat/ice application    [x] other: Issued written HEP and reviewed     Other Objective/Functional Measures:    See FS,      Post Treatment Pain Level (on 0 to 10) scale:   0  / 10     ASSESSMENT  Assessment/Changes in Function:     Capsular mobility remains limited. Poor scapular humeral proprioception and difficulty correcting abn scapular elevation during scapular mobilization     []  See Progress Note/Recertification   Patient will continue to benefit from skilled PT services to modify and progress therapeutic interventions, address functional mobility deficits, address ROM deficits, address strength deficits, analyze and address soft tissue restrictions, analyze and cue movement patterns, analyze and modify body mechanics/ergonomics, assess and modify postural abnormalities, address imbalance/dizziness and instruct in home and community integration to attain remaining goals.    Progress toward goals / Updated goals:    Slow progress towards ROM goals     PLAN  [x] Upgrade activities as tolerated YES Continue plan of care   []  Discharge due to :    []  Other:      Therapist: Tamie Chang, PT, DPT, MTC, CMTPT    Date: 1/4/2023 Time: 11:30 AM     Future Appointments   Date Time Provider Parker Del Angel   1/6/2023  3:30 PM Sophia Mera, PT [facility]   1/11/2023  5:00 PM Pavithra Howard, PT [facility]   1/12/2023  4:00 PM Sophia Mera, PT [facility]   1/18/2023  5:00 PM Pavithra Howard, PT [facility]   1/30/2023  5:30 PM Pavithra Villaing, PT [facility]   2/1/2023  5:30 PM Pavithra [place], PT [facility]

## 2023-01-06 ENCOUNTER — HOSPITAL ENCOUNTER (OUTPATIENT)
Dept: PHYSICAL THERAPY | Age: 53
Discharge: HOME | End: 2023-01-06
Payer: OTHER GOVERNMENT

## 2023-01-06 PROCEDURE — 97110 THERAPEUTIC EXERCISES: CPT

## 2023-01-06 PROCEDURE — 97140 MANUAL THERAPY 1/> REGIONS: CPT

## 2023-01-06 PROCEDURE — 97530 THERAPEUTIC ACTIVITIES: CPT

## 2023-01-06 PROCEDURE — 97014 ELECTRIC STIMULATION THERAPY: CPT

## 2023-01-06 NOTE — PROGRESS NOTES
PHYSICAL THERAPY - DAILY TREATMENT NOTE    Patient Name: Jorgito Julio        Date: 2023  : 1970   YES Patient  Verified  Visit #:     Insurance: Payor: [facility] / Plan: Claudia Avalos / Product Type: Federal Funded Programs /      In time: 325 Out time: 430   Total Treatment Time: 60     BCBS/Medicare Time Tracking (below)   Total Timed Codes (min):  NA 1:1 Treatment Time:  NA     TREATMENT AREA =  Pain in left shoulder [M25.512]    SUBJECTIVE  Pain Level (on 0 to 10 scale):  1  / 10   Medication Changes/New allergies or changes in medical history, any new surgeries or procedures? NO    If yes, update Summary List   Subjective Functional Status/Changes:  []  No changes reported     Pt reports he is noticing improvements in ROM. Modalities Rationale:     decrease edema, decrease inflammation, and decrease pain to improve patient's ability to perform pain-free ADLs.    15 min [x] Estim, type/location: IFC L shoulder seated                                      []  att     [x]  unatt     []  w/US     [x]  w/ice    []  w/heat    min []  Mechanical Traction: type/lbs                   []  pro   []  sup   []  int   []  cont    []  before manual    []  after manual    min []  Ultrasound, settings/location:      min []  Iontophoresis w/ dexamethasone, location:                                               []  take home patch       []  in clinic    min []  Ice     []  Heat    location/position:     min []  Vasopneumatic Device, press/temp:    If using vaso (only need to measure limb vaso being performed on)      pre-treatment girth :       post-treatment girth :       measured at (landmark location) :      min []  Other:    [x] Skin assessment post-treatment (if applicable):    [x]  intact    [x]  redness- no adverse reaction                  []redness - adverse reaction:        20 min Therapeutic Exercise:  [x]  See flow sheet   Rationale:      increase ROM, increase strength, improve coordination, and increase proprioception to improve the patients ability to perform unlimited ADLs. 15 min Manual Therapy: PROM and stretching into flex, ER and IR, grade 3-4 AP mobs to GHJ   Rationale:      decrease pain, increase ROM, increase tissue extensibility, and decrease edema  to improve patient's ability to improve OH motion  The manual therapy interventions were performed at a separate and distinct time from the therapeutic activities interventions. 10 min Therapeutic Activity: [x]  See flow sheet   Rationale:    increase ROM, increase strength, improve coordination, and increase proprioception to improve the patients ability to resume OH motion      Billed With/As:   [x] TE   [] TA   [] Neuro   [] Self Care Patient Education: [x] Review HEP    [] Progressed/Changed HEP based on:   [] positioning   [] body mechanics   [] transfers   [] heat/ice application    [] other:      Other Objective/Functional Measures: Added doorway ER     Post Treatment Pain Level (on 0 to 10) scale:   1  / 10     ASSESSMENT  Assessment/Changes in Function:     Some discomfort noted during supine on FR; encouraged to perform slowly and not adjust until discomfort has resolved. Capsular motion improves with each session but pt returns with similar limitations at beginning of each session. []  See Progress Note/Recertification   Patient will continue to benefit from skilled PT services to modify and progress therapeutic interventions, address functional mobility deficits, address ROM deficits, address strength deficits, analyze and address soft tissue restrictions, analyze and cue movement patterns, analyze and modify body mechanics/ergonomics, and assess and modify postural abnormalities to attain remaining goals. Progress toward goals / Updated goals:    Progressing towards LTG 3.      PLAN  [x]  Upgrade activities as tolerated YES Continue plan of care   []  Discharge due to : []  Other:      Therapist: Trang Caceres DPT    Date: 1/6/2023 Time: 2:01 PM     Future Appointments   Date Time Provider Parker Del Angel   1/11/2023  5:00 PM Gibson Kapoor, PT [facility]   1/12/2023  4:00 PM Dunia Almaraz, PT [facility]   1/18/2023  5:00 PM Gibson Kapoor, PT [facility]   1/30/2023  5:30 PM Gibson Kapoor, PT [facility]   2/1/2023  5:30 PM Gibson Kapoor, PT [facility]

## 2023-01-11 ENCOUNTER — HOSPITAL ENCOUNTER (OUTPATIENT)
Dept: PHYSICAL THERAPY | Age: 53
Discharge: HOME | End: 2023-01-11
Payer: OTHER GOVERNMENT

## 2023-01-11 PROCEDURE — 97140 MANUAL THERAPY 1/> REGIONS: CPT

## 2023-01-11 PROCEDURE — 97110 THERAPEUTIC EXERCISES: CPT

## 2023-01-11 PROCEDURE — 97112 NEUROMUSCULAR REEDUCATION: CPT

## 2023-01-11 NOTE — PROGRESS NOTES
PHYSICAL THERAPY - DAILY TREATMENT NOTE    Patient Name: Matthew Avelar        Date: 2023  : 1970   YES Patient  Verified  Visit #:     Insurance: Payor: [facility] / Plan: Mandie Jackson / Product Type: Federal Funded Programs /      In time: 500 Out time: 600   Total Treatment Time: 55     BCBS/Medicare Time Tracking (below)   Total Timed Codes (min):   1:1 Treatment Time:       TREATMENT AREA =  Pain in left shoulder [M25.512]    SUBJECTIVE  Pain Level (on 0 to 10 scale):  0  / 10   Medication Changes/New allergies or changes in medical history, any new surgeries or procedures?     NO    If yes, update Summary List   Subjective Functional Status/Changes:  []  No changes reported     Havent worked out in 10 days so havent stretched as much       Modalities Rationale:     decrease inflammation, decrease pain and increase tissue extensibility to improve patient's ability to perform ADLs   min [] Estim, type/location:                                     []  att     [x]  unatt     []  w/US     [x]  w/ice    []  w/heat    min []  Mechanical Traction: type/lbs                   []  pro   []  sup   []  int   []  cont    []  before manual    []  after manual    min []  Ultrasound, settings/location:      min []  Iontophoresis w/ dexamethasone, location:                                               []  take home patch       []  in clinic   10 min [x]  Ice     []  Heat    location/position: L shoulder    min []  Vasopneumatic Device, press/temp: If using vaso (only need to measure limb vaso being performed on)      pre-treatment girth :       post-treatment girth :       measured at (landmark location) :      min []  Other:    [x] Skin assessment post-treatment (if applicable):    [x]  intact    [x]  redness- no adverse reaction     []redness - adverse reaction:        20 min Therapeutic Exercise:  [x]  See flow sheet   Rationale:      increase ROM and increase strength to improve the patients ability to perform unlimted ADLs     15 min Manual Therapy: Technique:      [] S/DTM []IASTM [x]PROM  [] Passive Stretching  []manual TPR  [x]Jt manipulation:Gr I [] II []  III [x] IV[x] V[]  Treatment/Area:  GHJ post glide in horz ADD, PROM into all planes, CR into ER at 90 abd   Rationale:      decrease pain, increase ROM, increase tissue extensibility and decrease trigger points to improve patient's ability to perform ADLs  The manual therapy interventions were performed at a separate and distinct time from the therapeutic activities interventions. 10 min Neuromuscular Re-ed: [x]  See flow sheet   Rationale:    increase strength and improve coordination to improve the patients ability to carry, and improve motor control    Billed With/As:   [x] TE   [] TA   [x] Neuro   [] Self Care Patient Education: [x] Review HEP    [] Progressed/Changed HEP based on:   [] positioning   [] body mechanics   [] transfers   [] heat/ice application    [x] other:      Other Objective/Functional Measures:    See FS     Post Treatment Pain Level (on 0 to 10) scale:   0  / 10     ASSESSMENT  Assessment/Changes in Function:     Continues to require cues for correct set up of sleeper stretch to retract scapula and correct excessive protraction. Good tolerance to prolonged hold of sleeper ADD only. []  See Progress Note/Recertification   Patient will continue to benefit from skilled PT services to modify and progress therapeutic interventions, address functional mobility deficits, address ROM deficits, address strength deficits, analyze and address soft tissue restrictions, analyze and cue movement patterns, analyze and modify body mechanics/ergonomics, assess and modify postural abnormalities, address imbalance/dizziness and instruct in home and community integration to attain remaining goals.    Progress toward goals / Updated goals:    Slow progress towards ROM goals, capsular mobility remains limited in post/inf glides     PLAN  [x]  Upgrade activities as tolerated YES Continue plan of care   []  Discharge due to :    []  Other:      Therapist: Steffi Phipps, PT, DPT, MTC, CMTPT    Date: 1/11/2023 Time: 11:30 AM     Future Appointments   Date Time Provider Parker Del Angel   1/12/2023  4:00 PM Josiane Mccarty, PT [facility]   1/18/2023  5:00 PM Malik García PT [facility]   1/30/2023  5:30 PM Malik García PT [facility]   2/1/2023  5:30 PM [facility]

## 2023-01-12 ENCOUNTER — HOSPITAL ENCOUNTER (OUTPATIENT)
Dept: PHYSICAL THERAPY | Age: 53
Discharge: HOME | End: 2023-01-12
Payer: OTHER GOVERNMENT

## 2023-01-12 PROCEDURE — 97530 THERAPEUTIC ACTIVITIES: CPT

## 2023-01-12 PROCEDURE — 97140 MANUAL THERAPY 1/> REGIONS: CPT

## 2023-01-12 PROCEDURE — 97110 THERAPEUTIC EXERCISES: CPT

## 2023-01-12 NOTE — PROGRESS NOTES
PHYSICAL THERAPY - DAILY TREATMENT NOTE    Patient Name: Sandra Haque        Date: 2023  : 1970   YES Patient  Verified  Visit #:     Insurance: Payor: [facility] / Plan: Peyton Solo / Product Type: Federal Funded Programs /      In time: 400 Out time: 505   Total Treatment Time: 60     BCBS/Medicare Time Tracking (below)   Total Timed Codes (min):  NA 1:1 Treatment Time:  NA     TREATMENT AREA =  Pain in left shoulder [M25.512]    SUBJECTIVE  Pain Level (on 0 to 10 scale):  3  / 10   Medication Changes/New allergies or changes in medical history, any new surgeries or procedures? NO    If yes, update Summary List   Subjective Functional Status/Changes:  []  No changes reported     Pt reports he has some soreness from PT yesterday. He isg getting better at doing exercises at home         Modalities Rationale:     decrease edema and decrease inflammation to improve patient's ability to perform pain-free ADLs.     min [] Estim, type/location:                                      []  att     []  unatt     []  w/US     []  w/ice    []  w/heat    min []  Mechanical Traction: type/lbs                   []  pro   []  sup   []  int   []  cont    []  before manual    []  after manual    min []  Ultrasound, settings/location:      min []  Iontophoresis w/ dexamethasone, location:                                               []  take home patch       []  in clinic   10 min [x]  Ice     []  Heat    location/position: L shoulder seated    min []  Vasopneumatic Device, press/temp:    If using vaso (only need to measure limb vaso being performed on)      pre-treatment girth :       post-treatment girth :       measured at (landmark location) :      min []  Other:    [x] Skin assessment post-treatment (if applicable):    [x]  intact    [x]  redness- no adverse reaction                  []redness - adverse reaction:        20 min Therapeutic Exercise:  [x]  See flow sheet Rationale:      increase ROM, increase strength, improve coordination, and increase proprioception to improve the patients ability to perform unlimited ADLs. 15 min Manual Therapy: PROM and stretching into flex, ER and IR, grade 3-4 AP mobs to GHJ   Rationale:      decrease pain, increase ROM, increase tissue extensibility, and decrease edema  to improve patient's ability to [facility] motion  The manual therapy interventions were performed at a separate and distinct time from the therapeutic activities interventions. 15 min Therapeutic Activity: [x]  See flow sheet   Rationale:    increase ROM, increase strength, improve coordination, and increase proprioception to improve the patients ability to resume IG motion    Billed With/As:   [x] TE   [] TA   [] Neuro   [] Self Care Patient Education: [x] Review HEP    [] Progressed/Changed HEP based on:   [] positioning   [] body mechanics   [] transfers   [] heat/ice application    [] other:      Other Objective/Functional Measures:    TE per FS     Post Treatment Pain Level (on 0 to 10) scale:   0  / 10     ASSESSMENT  Assessment/Changes in Function:     Pt is demonstrating improved mobility in recent weeks but remains limited overhead. Continued discussion on isolating capsular stretches and avoiding scapular compensations. []  See Progress Note/Recertification   Patient will continue to benefit from skilled PT services to modify and progress therapeutic interventions, address functional mobility deficits, address ROM deficits, address strength deficits, analyze and address soft tissue restrictions, analyze and cue movement patterns, analyze and modify body mechanics/ergonomics, and assess and modify postural abnormalities to attain remaining goals. Progress toward goals / Updated goals:    Progressing towards LTG 3.      PLAN  [x]  Upgrade activities as tolerated YES Continue plan of care   []  Discharge due to :    []  Other:      Therapist: Mercy Arndt, DPT    Date: 1/12/2023 Time: 12:51 PM     Future Appointments   Date Time Provider Parker Del Angel   1/12/2023  4:00 PM Meagan Santos, PT [facility]   1/18/2023  5:00 PM Randi Jeffers, PT [facility]   1/30/2023  5:30 PM Randi Jeffers, PT [facility]   2/1/2023  5:30 PM Randi Jeffers, PT [facility]

## 2023-01-18 ENCOUNTER — HOSPITAL ENCOUNTER (OUTPATIENT)
Dept: PHYSICAL THERAPY | Age: 53
Discharge: HOME | End: 2023-01-18
Payer: OTHER GOVERNMENT

## 2023-01-18 PROCEDURE — 97140 MANUAL THERAPY 1/> REGIONS: CPT

## 2023-01-18 PROCEDURE — 97112 NEUROMUSCULAR REEDUCATION: CPT

## 2023-01-18 PROCEDURE — 97110 THERAPEUTIC EXERCISES: CPT

## 2023-01-19 ENCOUNTER — APPOINTMENT (OUTPATIENT)
Dept: PHYSICAL THERAPY | Age: 53
End: 2023-01-19
Payer: OTHER GOVERNMENT

## 2023-01-19 NOTE — PROGRESS NOTES
PHYSICAL THERAPY - DAILY TREATMENT NOTE    Patient Name: Barron Cabello        Date: 2023  : 1970   YES Patient  Verified  Visit #:     Insurance: Payor: [facility] / Plan: Yeny Newsome / Product Type: Federal Funded Programs /      In time: 500 Out time: 600   Total Treatment Time: 55     BCBS/Medicare Time Tracking (below)   Total Timed Codes (min):   1:1 Treatment Time:       TREATMENT AREA =  Pain in left shoulder [M25.512]    SUBJECTIVE  Pain Level (on 0 to 10 scale):  0  / 10   Medication Changes/New allergies or changes in medical history, any new surgeries or procedures?     NO    If yes, update Summary List   Subjective Functional Status/Changes:  []  No changes reported     Havent worked out in 10 days so havent stretched as much       Modalities Rationale:     decrease inflammation, decrease pain and increase tissue extensibility to improve patient's ability to perform ADLs   min [] Estim, type/location:                                     []  att     [x]  unatt     []  w/US     [x]  w/ice    []  w/heat    min []  Mechanical Traction: type/lbs                   []  pro   []  sup   []  int   []  cont    []  before manual    []  after manual    min []  Ultrasound, settings/location:      min []  Iontophoresis w/ dexamethasone, location:                                               []  take home patch       []  in clinic   10 min [x]  Ice     []  Heat    location/position: L shoulder    min []  Vasopneumatic Device, press/temp: If using vaso (only need to measure limb vaso being performed on)      pre-treatment girth :       post-treatment girth :       measured at (landmark location) :      min []  Other:    [x] Skin assessment post-treatment (if applicable):    [x]  intact    [x]  redness- no adverse reaction     []redness - adverse reaction:        20 min Therapeutic Exercise:  [x]  See flow sheet   Rationale:      increase ROM and increase strength to improve the patients ability to perform unlimted ADLs     15 min Manual Therapy: Technique:      [] S/DTM []IASTM [x]PROM  [] Passive Stretching  []manual TPR  [x]Jt manipulation:Gr I [] II []  III [x] IV[x] V[]  Treatment/Area:  GHJ post glide in horz ADD, PROM into all planes, CR into ER at 90 abd   Rationale:      decrease pain, increase ROM, increase tissue extensibility and decrease trigger points to improve patient's ability to perform ADLs  The manual therapy interventions were performed at a separate and distinct time from the therapeutic activities interventions. 10 min Neuromuscular Re-ed: [x]  See flow sheet   Rationale:    increase strength and improve coordination to improve the patients ability to carry, and improve motor control    Billed With/As:   [x] TE   [] TA   [x] Neuro   [] Self Care Patient Education: [x] Review HEP    [] Progressed/Changed HEP based on:   [] positioning   [] body mechanics   [] transfers   [] heat/ice application    [x] other:      Other Objective/Functional Measures:    See FS     Post Treatment Pain Level (on 0 to 10) scale:   0  / 10     ASSESSMENT  Assessment/Changes in Function:     Continues to require cues for correct set up of sleeper stretch to retract scapula and correct excessive protraction. Good tolerance to prolonged hold of sleeper ADD only. []  See Progress Note/Recertification   Patient will continue to benefit from skilled PT services to modify and progress therapeutic interventions, address functional mobility deficits, address ROM deficits, address strength deficits, analyze and address soft tissue restrictions, analyze and cue movement patterns, analyze and modify body mechanics/ergonomics, assess and modify postural abnormalities, address imbalance/dizziness and instruct in home and community integration to attain remaining goals.    Progress toward goals / Updated goals:    Slow progress towards ROM goals, capsular mobility remains limited in post/inf glides     PLAN  [x]  Upgrade activities as tolerated YES Continue plan of care   []  Discharge due to :    []  Other:      Therapist: Lisa Patient, PT, DPT, MTC, CMTPT    Date: 1/18/2023 Time: 11:30 AM     Future Appointments   Date Time Provider Parker Del Angel   1/30/2023  5:30 PM Lyn Leblanc PT [facility]   2/1/2023  5:30 PM [facility]

## 2023-01-30 ENCOUNTER — HOSPITAL ENCOUNTER (OUTPATIENT)
Dept: PHYSICAL THERAPY | Age: 53
Discharge: HOME | End: 2023-01-30
Payer: OTHER GOVERNMENT

## 2023-01-30 PROCEDURE — 97110 THERAPEUTIC EXERCISES: CPT

## 2023-01-30 PROCEDURE — 97112 NEUROMUSCULAR REEDUCATION: CPT

## 2023-01-30 NOTE — PROGRESS NOTES
PHYSICAL THERAPY - DAILY TREATMENT NOTE    Patient Name: Nadeen Desai        Date: 2023  : 1970   YES Patient  Verified  Visit #:     Insurance: Payor: [facility] / Plan: Ciaran Abarca / Product Type: Federal Funded Programs /      In time: 530 Out time: 620   Total Treatment Time: 50     BCBS/Medicare Time Tracking (below)   Total Timed Codes (min):   1:1 Treatment Time:       TREATMENT AREA =  Pain in left shoulder [M25.512]    SUBJECTIVE  Pain Level (on 0 to 10 scale):  1  / 10   Medication Changes/New allergies or changes in medical history, any new surgeries or procedures?     NO    If yes, update Summary List   Subjective Functional Status/Changes:  []  No changes reported     Trying to move my arms more and can sleep on my L side a little now       Modalities Rationale:     decrease inflammation, decrease pain and increase tissue extensibility to improve patient's ability to perform ADLs   min [] Estim, type/location:                                     []  att     [x]  unatt     []  w/US     [x]  w/ice    []  w/heat    min []  Mechanical Traction: type/lbs                   []  pro   []  sup   []  int   []  cont    []  before manual    []  after manual    min []  Ultrasound, settings/location:      min []  Iontophoresis w/ dexamethasone, location:                                               []  take home patch       []  in clinic   10 min [x]  Ice     []  Heat    location/position: L shoulder in sitting    min []  Vasopneumatic Device, press/temp: If using vaso (only need to measure limb vaso being performed on)      pre-treatment girth :       post-treatment girth :       measured at (landmark location) :      min []  Other:    [x] Skin assessment post-treatment (if applicable):    [x]  intact    [x]  redness- no adverse reaction     []redness - adverse reaction:        30 min Therapeutic Exercise:  [x]  See flow sheet   Rationale:      increase ROM and increase strength to improve the patients ability to perform unlimted ADLs     10 min Neuromuscular Re-ed: [x]  See flow sheet   Rationale:    increase strength and improve coordination to improve the patients ability to carry, and improve motor control    Billed With/As:   [x] TE   [] TA   [x] Neuro   [] Self Care Patient Education: [x] Review HEP    [] Progressed/Changed HEP based on:   [] positioning   [] body mechanics   [] transfers   [] heat/ice application    [x] other: Issued written HEP and reviewed     Other Objective/Functional Measures:    See FS     Post Treatment Pain Level (on 0 to 10) scale:   0-1  / 10     ASSESSMENT  Assessment/Changes in Function:     Good tolerance to keep prevent shoulder mm gaurding focus was on improving spinal rotation     []  See Progress Note/Recertification   Patient will continue to benefit from skilled PT services to modify and progress therapeutic interventions, address functional mobility deficits, address ROM deficits, address strength deficits, analyze and address soft tissue restrictions, analyze and cue movement patterns, analyze and modify body mechanics/ergonomics, assess and modify postural abnormalities, address imbalance/dizziness and instruct in home and community integration to attain remaining goals.    Progress toward goals / Updated goals:  Partially met LTG1, added more exercises today and progressed     PLAN  [x]  Upgrade activities as tolerated YES Continue plan of care   []  Discharge due to :    []  Other:      Therapist: Brandan Thomas PT, DPT, MTC, CMTPT    Date: 1/30/2023 Time: 11:30 AM     Future Appointments   Date Time Provider Parker Del Angel   2/1/2023  5:30 PM Stevan Sheth PT [facility]

## 2023-02-01 ENCOUNTER — HOSPITAL ENCOUNTER (OUTPATIENT)
Dept: PHYSICAL THERAPY | Age: 53
Discharge: HOME | End: 2023-02-01
Payer: OTHER GOVERNMENT

## 2023-02-01 PROCEDURE — 97112 NEUROMUSCULAR REEDUCATION: CPT

## 2023-02-01 PROCEDURE — 97110 THERAPEUTIC EXERCISES: CPT

## 2023-02-01 NOTE — PROGRESS NOTES
PHYSICAL THERAPY - DAILY TREATMENT NOTE    Patient Name: Alex Grace        Date: 2023  : 1970   YES Patient  Verified  Visit #:     Insurance: Payor: [facility] / Plan: Antonio Gorman / Product Type: Federal Funded Programs /      In time: 530 Out time: 610   Total Treatment Time: 40     BCBS/Medicare Time Tracking (below)   Total Timed Codes (min):   1:1 Treatment Time:       TREATMENT AREA =  Pain in left shoulder [M25.512]    SUBJECTIVE  Pain Level (on 0 to 10 scale):  1  / 10   Medication Changes/New allergies or changes in medical history, any new surgeries or procedures? NO    If yes, update Summary List   Subjective Functional Status/Changes:  []  No changes reported     Back wasn't sore after last visit.  I stretch a lot in the hot shower       Modalities Rationale:     decrease inflammation, decrease pain and increase tissue extensibility to improve patient's ability to perform ADLs   min [] Estim, type/location:                                     []  att     [x]  unatt     []  w/US     [x]  w/ice    []  w/heat    min []  Mechanical Traction: type/lbs                   []  pro   []  sup   []  int   []  cont    []  before manual    []  after manual    min []  Ultrasound, settings/location:      min []  Iontophoresis w/ dexamethasone, location:                                               []  take home patch       []  in clinic   PD min []  Ice     []  Heat    location/position:     min []  Vasopneumatic Device, press/temp: If using vaso (only need to measure limb vaso being performed on)      pre-treatment girth :       post-treatment girth :       measured at (landmark location) :      min []  Other:    [x] Skin assessment post-treatment (if applicable):    [x]  intact    [x]  redness- no adverse reaction     []redness - adverse reaction:        30 min Therapeutic Exercise:  [x]  See flow sheet   Rationale:      increase ROM and increase strength to improve the patients ability to perform unlimted ADLs     10 min Neuromuscular Re-ed: [x]  See flow sheet   Rationale:    increase strength and improve coordination to improve the patients ability to carry, and improve motor control    Billed With/As:   [x] TE   [] TA   [x] Neuro   [] Self Care Patient Education: [x] Review HEP    [] Progressed/Changed HEP based on:   [] positioning   [] body mechanics   [] transfers   [] heat/ice application    [x] other: t/s ext stretch     Other Objective/Functional Measures:    See FS     Post Treatment Pain Level (on 0 to 10) scale:   1  / 10     ASSESSMENT  Assessment/Changes in Function:     Good tolerance to T/S ext stretching. Cues to improve t/s proprioception and ext needed to achieve more shoulder flexion and fxnl ER     []  See Progress Note/Recertification   Patient will continue to benefit from skilled PT services to modify and progress therapeutic interventions, address functional mobility deficits, address ROM deficits, address strength deficits, analyze and address soft tissue restrictions, analyze and cue movement patterns, analyze and modify body mechanics/ergonomics, assess and modify postural abnormalities, address imbalance/dizziness and instruct in home and community integration to attain remaining goals. Progress toward goals / Updated goals:    Partially met LTG1, added t/s ext stretches today     PLAN  [x]  Upgrade activities as tolerated YES Continue plan of care   []  Discharge due to :    []  Other:      Therapist: Whitney Hills, PT, DPT, MTC, CMTPT    Date: 2/1/2023 Time: 11:30 AM     No future appointments.

## 2023-02-27 ENCOUNTER — HOSPITAL ENCOUNTER (OUTPATIENT)
Facility: HOSPITAL | Age: 53
Setting detail: RECURRING SERIES
Discharge: HOME | End: 2023-03-02
Payer: OTHER GOVERNMENT

## 2023-02-27 PROCEDURE — 97110 THERAPEUTIC EXERCISES: CPT

## 2023-02-27 PROCEDURE — 97112 NEUROMUSCULAR REEDUCATION: CPT

## 2023-02-27 NOTE — PROGRESS NOTES
PHYSICAL / OCCUPATIONAL THERAPY - DAILY TREATMENT NOTE (updated )    Patient Name: Usha Walter    Date: 2023    : 1970  Insurance: Payor: Daniel Alvarado / Plan: Daniel Alvarado / Product Type: *No Product type* /      Patient  verified Yes     Visit #   Current / Total 26 30   Time   In / Out 1000 1055   Pain   In / Out 1 1   Subjective Functional Status/Changes: I havent been the best about  doing my stretches. I still struggle reaching my arm up   Changes to:  Meds, Allergies, Med Hx, Sx Hx? If yes, update Summary List yes       TREATMENT AREA =  Left shoulder pain [M25.512]    OBJECTIVE    Modalities Rationale:     decrease inflammation to improve patient's ability to progress to PLOF and address remaining functional goals. min [] Estim Unattended, type/location:                                      []  w/ice    []  w/heat    min [] Estim Attended, type/location:                                     []  w/US     []  w/ice    []  w/heat    []  TENS insruct      min []  Mechanical Traction: type/lbs                   []  pro   []  sup   []  int   []  cont    []  before manual    []  after manual    min []  Ultrasound, settings/location:      min []  Iontophoresis w/ dexamethasone, location:                                               []  take home patch       []  in clinic   10 min  unbill [x]  Ice     []  Heat    location/position: L shoulder sitting    min []  Paraffin,  details:     min []  Vasopneumatic Device, press/temp:     min []  Rock Luster / Azeem Anguiano: If using vaso (only need to measure limb vaso being performed on)      pre-treatment girth :       post-treatment girth :       measured at (landmark location) :      min []  Other:    Skin assessment post-treatment (if applicable):    []  intact    []  redness- no adverse reaction                 []redness - adverse reaction:         Therapeutic Procedures:   Tx Min Billable or 1:1 Min Procedure, Rationale, Specifics     22000 Therapeutic Exercise (timed):  increase ROM, strength, coordination, balance, and proprioception to improve patient's ability to progress to PLOF and address remaining functional goals. (see flow sheet as applicable)     Details if applicable:       10  14029 Neuromuscular Re-Education (timed):  improve balance, coordination, kinesthetic sense, posture, core stability and proprioception to improve patient's ability to develop conscious control of individual muscles and awareness of position of extremities in order to progress to PLOF and address remaining functional goals. (see flow sheet as applicable)     Details if applicable:            Details if applicable:            Details if applicable:            Details if applicable:     36  [facility] Totals Reminder: bill using total billable min of TIMED therapeutic procedures (example: do not include dry needle or estim unattended, both untimed codes, in totals to left)  8-22 min = 1 unit; 23-37 min = 2 units; 38-52 min = 3 units; 53-67 min = 4 units; 68-82 min = 5 units   Total Total     [x]  Patient Education billed concurrently with other procedures   [x] Review HEP    [] Progressed/Changed HEP, detail: sleeper stretch 3 way for 1 min hold time  [] Other detail:       Objective Information/Functional Measures/Assessment    Hypomobile GHJ inf/post glides, scapulohumeral elevation during flex/abd >90 degrees    Patient will continue to benefit from skilled PT / OT services to modify and progress therapeutic interventions, analyze and address functional mobility deficits, analyze and address ROM deficits, analyze and address strength deficits, analyze and address soft tissue restrictions, analyze and cue for proper movement patterns, analyze and modify for postural abnormalities, analyze and address imbalance/dizziness, and instruct in home and community integration to address functional deficits and attain remaining goals.     Progress toward goals / Updated goals:  [] See Progress Note/Recertification    No progress towards LTG1, poor HEP compliance    PLAN  Yes  Continue plan of care  []  Upgrade activities as tolerated  []  Discharge due to :  []  Other:    Samir Belle, PT    2/27/2023    11:53 AM    Future Appointments   Date Time Provider Veronica Stanford   3/2/2023  8:30 AM Africa Chapman, PT [facility] [place] [facility]   3/6/2023  4:00 PM Africa Chapman, PT [facility]   3/9/2023  8:30 AM Layne Chapman, PT [facility]

## 2023-03-02 ENCOUNTER — HOSPITAL ENCOUNTER (OUTPATIENT)
Facility: HOSPITAL | Age: 53
Setting detail: RECURRING SERIES
Discharge: HOME | End: 2023-03-05
Payer: OTHER GOVERNMENT

## 2023-03-02 PROCEDURE — 97110 THERAPEUTIC EXERCISES: CPT

## 2023-03-02 PROCEDURE — 97112 NEUROMUSCULAR REEDUCATION: CPT

## 2023-03-02 NOTE — PROGRESS NOTES
PHYSICAL / OCCUPATIONAL THERAPY - DAILY TREATMENT NOTE (updated )    Patient Name: Jose Miguel Lopez    Date: 3/2/2023    : 1970  Insurance: Payor: Casper Anne / Plan: [employer] / Product Type: *No Product type* /      Patient  verified Yes     Visit #   Current / Total 27 30   Time   In / Out 835 915   Pain   In / Out 0 0   Subjective Functional Status/Changes: Trying to stretch more   Changes to:  Meds, Allergies, Med Hx, Sx Hx? If yes, update Summary List yes       TREATMENT AREA =  Left shoulder pain [M25.512]    OBJECTIVE    Therapeutic Procedures: Tx Min Billable or 1:1 Min Procedure, Rationale, Specifics   30  [phone] Therapeutic Exercise (timed):  increase ROM, strength, coordination, balance, and proprioception to improve patient's ability to progress to PLOF and address remaining functional goals. (see flow sheet as applicable)     Details if applicable:       10  33372 Neuromuscular Re-Education (timed):  improve balance, coordination, kinesthetic sense, posture, core stability and proprioception to improve patient's ability to develop conscious control of individual muscles and awareness of position of extremities in order to progress to PLOF and address remaining functional goals.  (see flow sheet as applicable)     Details if applicable:            Details if applicable:            Details if applicable:            Details if applicable:     36  [facility] Totals Reminder: bill using total billable min of TIMED therapeutic procedures (example: do not include dry needle or estim unattended, both untimed codes, in totals to left)  8-22 min = 1 unit; 23-37 min = 2 units; 38-52 min = 3 units; 53-67 min = 4 units; 68-82 min = 5 units   Total Total     [x]  Patient Education billed concurrently with other procedures   [x] Review HEP    [] Progressed/Changed HEP, detail: sleeper stretch 3 way for 1 min hold time  [] Other detail:       Objective Information/Functional Measures/Assessment    Good tolerance to inc duration of all stretching. Cues to dec [place] firing nearing end range scaption    Patient will continue to benefit from skilled PT / OT services to modify and progress therapeutic interventions, analyze and address functional mobility deficits, analyze and address ROM deficits, analyze and address strength deficits, analyze and address soft tissue restrictions, analyze and cue for proper movement patterns, analyze and modify for postural abnormalities, analyze and address imbalance/dizziness, and instruct in home and community integration to address functional deficits and attain remaining goals.     Progress toward goals / Updated goals:  []  See Progress Note/Recertification    Progressing towards LTG1    PLAN  Yes  Continue plan of care  []  Upgrade activities as tolerated  []  Discharge due to :  []  Other:    Cheko Preciado, PT    3/2/2023    2:32 PM    Future Appointments   Date Time Provider Veronica Stanford   3/6/2023  4:00 PM Africa Mcdaniels, PT [facility] [place] [facility]   3/9/2023  8:30 AM Africa Mcdaniels, PT [facility]   3/13/2023  3:00 PM Africa Mcdaniels, PT [facility]   3/16/2023  8:00 AM Africa Mcdaniels, PT [facility]   3/20/2023 10:30 AM Africa Mcdaniels, PT [facility]   3/27/2023  3:00 PM Africa Mcdaniels, PT [facility]   3/29/2023 11:00 AM Africa Mcdaniels, PT [facility]

## 2023-03-06 ENCOUNTER — HOSPITAL ENCOUNTER (OUTPATIENT)
Facility: HOSPITAL | Age: 53
Setting detail: RECURRING SERIES
Discharge: HOME | End: 2023-03-09
Payer: OTHER GOVERNMENT

## 2023-03-06 PROCEDURE — 97110 THERAPEUTIC EXERCISES: CPT

## 2023-03-06 PROCEDURE — 97112 NEUROMUSCULAR REEDUCATION: CPT

## 2023-03-06 NOTE — PROGRESS NOTES
PHYSICAL / OCCUPATIONAL THERAPY - DAILY TREATMENT NOTE (updated )    Patient Name: Alannah Preston    Date: 3/6/2023    : 1970  Insurance: Payor: Evorn Passey / Plan: Evorn Passey / Product Type: *No Product type* /      Patient  verified Yes     Visit #   Current / Total 28 30   Time   In / Out 400 500   Pain   In / Out 0 0   Subjective Functional Status/Changes: Can I do pullups and  press   Changes to:  Meds, Allergies, Med Hx, Sx Hx? If yes, update Summary List yes       TREATMENT AREA =  Left shoulder pain [M25.512]    OBJECTIVE  Modalities Rationale:     decrease inflammation and decrease pain to improve patient's ability to progress to PLOF and address remaining functional goals. min [] Estim Unattended, type/location:                                      []  w/ice    []  w/heat    min [] Estim Attended, type/location:                                     []  w/US     []  w/ice    []  w/heat    []  TENS insruct      min []  Mechanical Traction: type/lbs                   []  pro   []  sup   []  int   []  cont    []  before manual    []  after manual    min []  Ultrasound, settings/location:      min []  Iontophoresis w/ dexamethasone, location:                                               []  take home patch       []  in clinic   10 min  unbill [x]  Ice     []  Heat    location/position: L shoulder sitting    min []  Paraffin,  details:     min []  Vasopneumatic Device, press/temp:     min []  Jodene Massing / Natalia Silvius: If using vaso (only need to measure limb vaso being performed on)      pre-treatment girth :       post-treatment girth :       measured at (landmark location) :      min []  Other:    Skin assessment post-treatment (if applicable):    []  intact    []  redness- no adverse reaction                 []redness - adverse reaction:          Therapeutic Procedures:   Tx Min Billable or 1:1 Min Procedure, Rationale, Specifics   30  [id number] Therapeutic Exercise (timed): increase ROM, strength, coordination, balance, and proprioception to improve patient's ability to progress to PLOF and address remaining functional goals. (see flow sheet as applicable)     Details if applicable:       10  78507 Neuromuscular Re-Education (timed):  improve balance, coordination, kinesthetic sense, posture, core stability and proprioception to improve patient's ability to develop conscious control of individual muscles and awareness of position of extremities in order to progress to PLOF and address remaining functional goals. (see flow sheet as applicable)     Details if applicable:  proprioceptive cues needed for patient to understand he is lacking normal GHJ and thoracic mobility needed to perform in question exercises with correct form          Details if applicable:            Details if applicable:            Details if applicable:     36  [facility] Totals Reminder: bill using total billable min of TIMED therapeutic procedures (example: do not include dry needle or estim unattended, both untimed codes, in totals to left)  8-22 min = 1 unit; 23-37 min = 2 units; 38-52 min = 3 units; 53-67 min = 4 units; 68-82 min = 5 units   Total Total     [x]  Patient Education billed concurrently with other procedures   [x] Review HEP    [] Progressed/Changed HEP, detail:   [] Other detail:       Objective Information/Functional Measures/Assessment  Progressed treatment as appropriate with good patient tolerance.  Improved form with visual cues      Patient will continue to benefit from skilled PT / OT services to modify and progress therapeutic interventions, analyze and address functional mobility deficits, analyze and address ROM deficits, analyze and address strength deficits, analyze and address soft tissue restrictions, analyze and cue for proper movement patterns, analyze and modify for postural abnormalities, analyze and address imbalance/dizziness, and instruct in home and community integration to address functional deficits and attain remaining goals.     Progress toward goals / Updated goals:  []  See Progress Note/Recertification    Progressing towards L shoulder strength goals    PLAN  Yes  Continue plan of care  []  Upgrade activities as tolerated  []  Discharge due to :  []  Other:    Julian Piper, PT    3/6/2023    4:48 PM    Future Appointments   Date Time Provider eVronica Stanford   3/9/2023  8:30 AM Africa Fairbanks, PT [facility]   3/13/2023  3:00 PM Africa Fairbanks, PT [facility]   3/16/2023  8:00 AM Rachid Fairbanks, PT [facility]   3/20/2023 10:30 AM Africa Fairbanks, PT [facility]   3/27/2023  3:00 PM Africa Fairbanks, PT [facility]   3/29/2023 11:00 AM Africa Fairbanks, PT [facility]

## 2023-03-09 ENCOUNTER — HOSPITAL ENCOUNTER (OUTPATIENT)
Facility: HOSPITAL | Age: 53
Setting detail: RECURRING SERIES
Discharge: HOME | End: 2023-03-12
Payer: OTHER GOVERNMENT

## 2023-03-09 PROCEDURE — 97110 THERAPEUTIC EXERCISES: CPT

## 2023-03-09 PROCEDURE — 97112 NEUROMUSCULAR REEDUCATION: CPT

## 2023-03-09 NOTE — PROGRESS NOTES
PHYSICAL / OCCUPATIONAL THERAPY - DAILY TREATMENT NOTE (updated )    Patient Name: Geoff Taylor    Date: 3/9/2023    : 1970  Insurance: Payor: Mortimer Pander / Plan: Mortimer Pander / Product Type: *No Product type* /      Patient  verified Yes     Visit #   Current / Total 29 30   Time   In / Out 835 935   Pain   In / Out 0 0   Subjective Functional Status/Changes: Russel Abraham down the garage stairs and landed on my shoulder   Changes to:  Meds, Allergies, Med Hx, Sx Hx? If yes, update Summary List yes       TREATMENT AREA =  Left shoulder pain [M25.512]    OBJECTIVE  Modalities Rationale:     decrease inflammation and decrease pain to improve patient's ability to progress to PLOF and address remaining functional goals. min [] Estim Unattended, type/location:                                      []  w/ice    []  w/heat    min [] Estim Attended, type/location:                                     []  w/US     []  w/ice    []  w/heat    []  TENS insruct      min []  Mechanical Traction: type/lbs                   []  pro   []  sup   []  int   []  cont    []  before manual    []  after manual    min []  Ultrasound, settings/location:      min []  Iontophoresis w/ dexamethasone, location:                                               []  take home patch       []  in clinic   10 min  unbill [x]  Ice     []  Heat    location/position: L shoulder sitting    min []  Paraffin,  details:     min []  Vasopneumatic Device, press/temp:     min []  Wendi Idler / Wadell Castro: If using vaso (only need to measure limb vaso being performed on)      pre-treatment girth :       post-treatment girth :       measured at (landmark location) :      min []  Other:    Skin assessment post-treatment (if applicable):    []  intact    []  redness- no adverse reaction                 []redness - adverse reaction:          Therapeutic Procedures:   Tx Min Billable or 1:1 Min Procedure, Rationale, Specifics   30  [phone] Therapeutic Exercise (timed):  increase ROM, strength, coordination, balance, and proprioception to improve patient's ability to progress to PLOF and address remaining functional goals. (see flow sheet as applicable)     Details if applicable:       10  33989 Neuromuscular Re-Education (timed):  improve balance, coordination, kinesthetic sense, posture, core stability and proprioception to improve patient's ability to develop conscious control of individual muscles and awareness of position of extremities in order to progress to PLOF and address remaining functional goals.  (see flow sheet as applicable)     Details if applicable:  proprioceptive cues needed for patient to understand he is lacking normal GHJ and thoracic mobility needed to perform in question exercises with correct form          Details if applicable:            Details if applicable:            Details if applicable:     36  [facility] Totals Reminder: bill using total billable min of TIMED therapeutic procedures (example: do not include dry needle or estim unattended, both untimed codes, in totals to left)  8-22 min = 1 unit; 23-37 min = 2 units; 38-52 min = 3 units; 53-67 min = 4 units; 68-82 min = 5 units   Total Total     [x]  Patient Education billed concurrently with other procedures   [x] Review HEP    [] Progressed/Changed HEP, detail:   [] Other detail:       Objective Information/Functional Measures/Assessment    Despite reports of soreness no change in AROM/PROM compared to last visit by end of [place]      Patient will continue to benefit from skilled PT / OT services to modify and progress therapeutic interventions, analyze and address functional mobility deficits, analyze and address ROM deficits, analyze and address strength deficits, analyze and address soft tissue restrictions, analyze and cue for proper movement patterns, analyze and modify for postural abnormalities, analyze and address imbalance/dizziness, and instruct in home and community integration to address functional deficits and attain remaining goals.     Progress toward goals / Updated goals:  []  See Progress Note/Recertification    AROM IR most limited and not fxnl    PLAN  Yes  Continue plan of care  []  Upgrade activities as tolerated  []  Discharge due to :  []  Other:    Javy Hodge, PT    3/9/2023    1:37 PM    Future Appointments   Date Time Provider Veronica Stanford   3/13/2023  3:00 PM Britni Char Paget, PT [facility]   3/16/2023  8:00 AM Emmanuel Litten Char Paget, PT [facility]   3/20/2023 10:30 AM Britni Char Paget, PT [facility]   3/27/2023  3:00 PM Britni Char Paget, PT [facility]   3/29/2023 11:00 AM Britni Char Paget, PT [facility]

## 2023-03-13 ENCOUNTER — HOSPITAL ENCOUNTER (OUTPATIENT)
Facility: HOSPITAL | Age: 53
Setting detail: RECURRING SERIES
Discharge: HOME | End: 2023-03-16
Payer: OTHER GOVERNMENT

## 2023-03-13 PROCEDURE — 97112 NEUROMUSCULAR REEDUCATION: CPT

## 2023-03-13 PROCEDURE — 97110 THERAPEUTIC EXERCISES: CPT

## 2023-03-13 NOTE — PROGRESS NOTES
PHYSICAL / OCCUPATIONAL THERAPY - DAILY TREATMENT NOTE (updated )    Patient Name: Krystyna Douglas    Date: 3/13/2023    : 1970  Insurance: Payor: Jennifer Dooley / Plan: Jennifer Dooley / Product Type: *No Product type* /      Patient  verified Yes     Visit #   Current / Total 30 35   Time   In / Out 305 405   Pain   In / Out 0-1 0   Subjective Functional Status/Changes: Still sore but not quite as bad. I was able to swim saturday   Changes to:  Meds, Allergies, Med Hx, Sx Hx? If yes, update Summary List yes       TREATMENT AREA =  Left shoulder pain [M25.512]    OBJECTIVE  Modalities Rationale:     decrease inflammation and decrease pain to improve patient's ability to progress to PLOF and address remaining functional goals. min [] Estim Unattended, type/location:                                      []  w/ice    []  w/heat    min [] Estim Attended, type/location:                                     []  w/US     []  w/ice    []  w/heat    []  TENS insruct      min []  Mechanical Traction: type/lbs                   []  pro   []  sup   []  int   []  cont    []  before manual    []  after manual    min []  Ultrasound, settings/location:      min []  Iontophoresis w/ dexamethasone, location:                                               []  take home patch       []  in clinic   10 min  unbill [x]  Ice     []  Heat    location/position: L shoulder sitting    min []  Paraffin,  details:     min []  Vasopneumatic Device, press/temp:     min []  Felton Ellison / Kelly Phillips: If using vaso (only need to measure limb vaso being performed on)      pre-treatment girth :       post-treatment girth :       measured at (landmark location) :      min []  Other:    Skin assessment post-treatment (if applicable):    []  intact    []  redness- no adverse reaction                 []redness - adverse reaction:          Therapeutic Procedures:   Tx Min Billable or 1:1 Min Procedure, Rationale, Specifics   35  [id number] Therapeutic Exercise (timed):  increase ROM, strength, coordination, balance, and proprioception to improve patient's ability to progress to PLOF and address remaining functional goals. (see flow sheet as applicable)     Details if applicable:       10  42678 Neuromuscular Re-Education (timed):  improve balance, coordination, kinesthetic sense, posture, core stability and proprioception to improve patient's ability to develop conscious control of individual muscles and awareness of position of extremities in order to progress to PLOF and address remaining functional goals. (see flow sheet as applicable)     Details if applicable:            Details if applicable:            Details if applicable:            Details if applicable:     39  [facility] Totals Reminder: bill using total billable min of TIMED therapeutic procedures (example: do not include dry needle or estim unattended, both untimed codes, in totals to left)  8-22 min = 1 unit; 23-37 min = 2 units; 38-52 min = 3 units; 53-67 min = 4 units; 68-82 min = 5 units   Total Total     [x]  Patient Education billed concurrently with other procedures   [x] Review HEP    [] Progressed/Changed HEP, detail: supine over FR, book opener, S/L ER and ABD  [] Other detail:       Objective Information/Functional Measures/Assessment  Quick to fatigue with S/L exercises, unable to maintain correct form with added resistance    Patient will continue to benefit from skilled PT / OT services to modify and progress therapeutic interventions, analyze and address functional mobility deficits, analyze and address ROM deficits, analyze and address strength deficits, analyze and address soft tissue restrictions, analyze and cue for proper movement patterns, analyze and modify for postural abnormalities, analyze and address imbalance/dizziness, and instruct in home and community integration to address functional deficits and attain remaining goals.     Progress toward goals / Updated goals: []  See Progress Note/Recertification    Progress towards strength goals: ER MMT 3/5    PLAN  Yes  Continue plan of care  []  Upgrade activities as tolerated  []  Discharge due to :  []  Other:    Kristi Ayoub, PT    3/13/2023    6:26 PM    Future Appointments   Date Time Provider Veronica Stanford   3/16/2023  8:00 AM Africa Spear, PT [facility] [place] [facility]   3/20/2023 10:30 AM Africa Spear, PT [facility]   3/27/2023  3:00 PM Africa Spear, PT [facility]   3/29/2023 11:00 AM Africa Spear, PT [facility]

## 2023-03-16 ENCOUNTER — APPOINTMENT (OUTPATIENT)
Facility: HOSPITAL | Age: 53
End: 2023-03-16
Payer: OTHER GOVERNMENT

## 2023-03-20 ENCOUNTER — HOSPITAL ENCOUNTER (OUTPATIENT)
Facility: HOSPITAL | Age: 53
Setting detail: RECURRING SERIES
Discharge: HOME | End: 2023-03-23
Payer: OTHER GOVERNMENT

## 2023-03-20 PROCEDURE — 97110 THERAPEUTIC EXERCISES: CPT

## 2023-03-20 PROCEDURE — 97112 NEUROMUSCULAR REEDUCATION: CPT

## 2023-03-21 NOTE — PROGRESS NOTES
PHYSICAL / OCCUPATIONAL THERAPY - DAILY TREATMENT NOTE (updated )    Patient Name: Usha Walter    Date: 3/21/2023    : 1970  Insurance: Payor: Daniel Alvarado / Plan: Daniel Alvarado / Product Type: *No Product type* /      Patient  verified Yes     Visit #   Current / Total 30 35   Time   In / Out  405   Pain   In / Out 0-1 0   Subjective Functional Status/Changes: Still sore but not quite as bad. I was able to swim saturday   Changes to:  Meds, Allergies, Med Hx, Sx Hx? If yes, update Summary List yes       TREATMENT AREA =  Left shoulder pain [M25.512]    OBJECTIVE  Modalities Rationale:     decrease inflammation and decrease pain to improve patient's ability to progress to PLOF and address remaining functional goals. min [] Estim Unattended, type/location:                                      []  w/ice    []  w/heat    min [] Estim Attended, type/location:                                     []  w/US     []  w/ice    []  w/heat    []  TENS insruct      min []  Mechanical Traction: type/lbs                   []  pro   []  sup   []  int   []  cont    []  before manual    []  after manual    min []  Ultrasound, settings/location:      min []  Iontophoresis w/ dexamethasone, location:                                               []  take home patch       []  in clinic   10 min  unbill [x]  Ice     []  Heat    location/position: L shoulder sitting    min []  Paraffin,  details:     min []  Vasopneumatic Device, press/temp:     min []  Rcok Luster / Azeem Anguiano: If using vaso (only need to measure limb vaso being performed on)      pre-treatment girth :       post-treatment girth :       measured at (landmark location) :      min []  Other:    Skin assessment post-treatment (if applicable):    []  intact    []  redness- no adverse reaction                 []redness - adverse reaction:          Therapeutic Procedures:   Tx Min Billable or 1:1 Min Procedure, Rationale, Specifics   [id number]

## 2023-03-27 ENCOUNTER — HOSPITAL ENCOUNTER (OUTPATIENT)
Facility: HOSPITAL | Age: 53
Setting detail: RECURRING SERIES
Discharge: HOME | End: 2023-03-30
Payer: OTHER GOVERNMENT

## 2023-03-27 PROCEDURE — 97110 THERAPEUTIC EXERCISES: CPT

## 2023-03-27 PROCEDURE — 97112 NEUROMUSCULAR REEDUCATION: CPT

## 2023-03-28 NOTE — PROGRESS NOTES
PHYSICAL / OCCUPATIONAL THERAPY - DAILY TREATMENT NOTE (updated )    Patient Name: Conception Meager    Date: 3/27/2023    : 1970  Insurance: Payor: Bee Rowley / Plan: Bee Rowley / Product Type: *No Product type* /      Patient  verified Yes     Visit #   Current / Total 31 35   Time   In / Out 1000 1045   Pain   In / Out 0 0   Subjective Functional Status/Changes: stiff   Changes to:  Meds, Allergies, Med Hx, Sx Hx? If yes, update Summary List yes       TREATMENT AREA =  Left shoulder pain [M25.512]    OBJECTIVE  Modalities Rationale:     decrease inflammation and decrease pain to improve patient's ability to progress to PLOF and address remaining functional goals. min [] Estim Unattended, type/location:                                      []  w/ice    []  w/heat    min [] Estim Attended, type/location:                                     []  w/US     []  w/ice    []  w/heat    []  TENS insruct      min []  Mechanical Traction: type/lbs                   []  pro   []  sup   []  int   []  cont    []  before manual    []  after manual    min []  Ultrasound, settings/location:      min []  Iontophoresis w/ dexamethasone, location:                                               []  take home patch       []  in clinic   PD min  unbill [x]  Ice     []  Heat    location/position: L shoulder sitting    min []  Paraffin,  details:     min []  Vasopneumatic Device, press/temp:     min []  Cathren Gasmen / Gilda Southward: If using vaso (only need to measure limb vaso being performed on)      pre-treatment girth :       post-treatment girth :       measured at (landmark location) :      min []  Other:    Skin assessment post-treatment (if applicable):    []  intact    []  redness- no adverse reaction                 []redness - adverse reaction:          Therapeutic Procedures:   Tx Min Billable or 1:1 Min Procedure, Rationale, Specifics   30  [id number] Therapeutic Exercise (timed):  increase ROM, strength,

## 2023-03-29 ENCOUNTER — HOSPITAL ENCOUNTER (OUTPATIENT)
Facility: HOSPITAL | Age: 53
Setting detail: RECURRING SERIES
Discharge: HOME | End: 2023-04-01
Payer: OTHER GOVERNMENT

## 2023-03-29 PROCEDURE — 97530 THERAPEUTIC ACTIVITIES: CPT

## 2023-03-29 PROCEDURE — 97110 THERAPEUTIC EXERCISES: CPT

## 2023-03-30 NOTE — PROGRESS NOTES
strength goals: ER MMT 3/5    PLAN  Yes  Continue plan of care  []  Upgrade activities as tolerated  []  Discharge due to :  []  Other:    Paris Waldrop, PT    3/30/2023    9:03 AM    Future Appointments   Date Time Provider Veronica Stanford   4/3/2023  5:30 PM Africa Givens, PT [facility]   4/10/2023  5:30 PM Africa Givens, PT [facility]   4/17/2023  5:40 PM Africa Givens, PT [facility]   4/24/2023  5:40 PM Codie Givens, PT [facility]   5/3/2023  5:40 PM Africa Givens, PT [facility]   5/11/2023  9:00 AM Codie Givens, PT [facility]

## 2023-04-17 ENCOUNTER — HOSPITAL ENCOUNTER (OUTPATIENT)
Facility: HOSPITAL | Age: 53
Setting detail: RECURRING SERIES
Discharge: HOME | End: 2023-04-20
Payer: OTHER GOVERNMENT

## 2023-04-17 PROCEDURE — 97110 THERAPEUTIC EXERCISES: CPT

## 2023-04-17 PROCEDURE — 97530 THERAPEUTIC ACTIVITIES: CPT

## 2023-04-17 NOTE — PROGRESS NOTES
shoulder AROM, flex limitations due to t/s ROM dec      PLAN  Yes  Continue plan of care  []  Upgrade activities as tolerated  []  Discharge due to :  []  Other:    Mayela Darling, PT    4/17/2023    6:04 PM    Future Appointments   Date Time Provider Veronica Stanford   4/24/2023  5:40 PM Africa Stoddard, PT [facility] [place] [facility]   5/3/2023  5:40 PM Africa Stoddard, PT [facility]   5/11/2023  9:00 AM Varinder Stoddard, PT [facility]

## 2023-04-24 ENCOUNTER — HOSPITAL ENCOUNTER (OUTPATIENT)
Facility: HOSPITAL | Age: 53
Setting detail: RECURRING SERIES
Discharge: HOME | End: 2023-04-27
Payer: OTHER GOVERNMENT

## 2023-04-24 PROCEDURE — 97530 THERAPEUTIC ACTIVITIES: CPT

## 2023-04-24 PROCEDURE — 97110 THERAPEUTIC EXERCISES: CPT

## 2023-04-24 PROCEDURE — 97112 NEUROMUSCULAR REEDUCATION: CPT

## 2023-05-03 ENCOUNTER — HOSPITAL ENCOUNTER (OUTPATIENT)
Facility: HOSPITAL | Age: 53
Setting detail: RECURRING SERIES
Discharge: HOME | End: 2023-05-06
Payer: OTHER GOVERNMENT

## 2023-05-03 PROCEDURE — 97112 NEUROMUSCULAR REEDUCATION: CPT

## 2023-05-03 PROCEDURE — 97110 THERAPEUTIC EXERCISES: CPT

## 2023-05-03 PROCEDURE — 97530 THERAPEUTIC ACTIVITIES: CPT

## 2023-05-03 NOTE — PROGRESS NOTES
PHYSICAL / OCCUPATIONAL THERAPY - DAILY TREATMENT NOTE (updated )    Patient Name: Amanda Lopez    Date: 5/3/2023    : 1970  Insurance: Payor: Price Oyster / Plan: [employer] Oyster / Product Type: *No Product type* /      Patient  verified Yes     Visit #   Current / Total 37 40   Time   In / Out 540 635   Pain   In / Out 0 0   Subjective Functional Status/Changes: Still getting the sharp pain in the back of my shoulder when I reach too quickly for something   Changes to:  Meds, Allergies, Med Hx, Sx Hx? If yes, update Summary List yes       TREATMENT AREA =  Left shoulder pain [M25.512]    OBJECTIVE  Therapeutic Procedures: Tx Min Billable or 1:1 Min Procedure, Rationale, Specifics   30  [id number] Therapeutic Exercise (timed):  increase ROM, strength, coordination, balance, and proprioception to improve patient's ability to progress to PLOF and address remaining functional goals. (see flow sheet as applicable)     Details if applicable:       15  54065 Neuromuscular Re-Education (timed):  improve balance, coordination, kinesthetic sense, posture, core stability and proprioception to improve patient's ability to develop conscious control of individual muscles and awareness of position of extremities in order to progress to PLOF and address remaining functional goals. (see flow sheet as applicable)     Details if applicable:     10   67555 Therapeutic Activity (timed):  use of dynamic activities replicating functional movements to increase ROM, strength, coordination, balance, and proprioception in order to improve patient's ability to progress to PLOF and address remaining functional goals.   (see flow sheet as applicable)       Details if applicable:  swimming mechanics          Details if applicable:            Details if applicable:     54  [facility] Totals Reminder: bill using total billable min of TIMED therapeutic procedures (example: do not include dry needle or estim unattended, both untimed codes, in

## 2023-05-11 ENCOUNTER — HOSPITAL ENCOUNTER (OUTPATIENT)
Facility: HOSPITAL | Age: 53
Setting detail: RECURRING SERIES
Discharge: HOME | End: 2023-05-14
Payer: OTHER GOVERNMENT

## 2023-05-11 PROCEDURE — 97112 NEUROMUSCULAR REEDUCATION: CPT

## 2023-05-11 PROCEDURE — 97110 THERAPEUTIC EXERCISES: CPT

## 2023-05-11 PROCEDURE — 97530 THERAPEUTIC ACTIVITIES: CPT

## 2023-05-15 ENCOUNTER — HOSPITAL ENCOUNTER (OUTPATIENT)
Facility: HOSPITAL | Age: 53
Setting detail: RECURRING SERIES
Discharge: HOME | End: 2023-05-18
Payer: OTHER GOVERNMENT

## 2023-05-15 PROCEDURE — 97110 THERAPEUTIC EXERCISES: CPT

## 2023-05-15 PROCEDURE — 97112 NEUROMUSCULAR REEDUCATION: CPT

## 2023-05-15 PROCEDURE — 97530 THERAPEUTIC ACTIVITIES: CPT

## 2023-05-15 NOTE — PROGRESS NOTES
PHYSICAL / OCCUPATIONAL THERAPY - DAILY TREATMENT NOTE (updated )    Patient Name: Venus Bobo    Date: 5/15/2023    : 1970  Insurance: Payor:  EAST / Plan: [employer] EAST / Product Type: *No Product type* /      Patient  verified Yes     Visit #   Current / Total 39 40   Time   In / Out 540 630   Pain   In / Out 1 0   Subjective Functional Status/Changes: Stiff from swimming but I was able to swim after last visit   Changes to:  Meds, Allergies, Med Hx, Sx Hx? If yes, update Summary List yes       TREATMENT AREA =  Left shoulder pain [M25.512]    OBJECTIVE  Therapeutic Procedures: Tx Min Billable or 1:1 Min Procedure, Rationale, Specifics   15  [id number] Therapeutic Exercise (timed):  increase ROM, strength, coordination, balance, and proprioception to improve patient's ability to progress to PLOF and address remaining functional goals. (see flow sheet as applicable)     Details if applicable:       15  01031 Neuromuscular Re-Education (timed):  improve balance, coordination, kinesthetic sense, posture, core stability and proprioception to improve patient's ability to develop conscious control of individual muscles and awareness of position of extremities in order to progress to PLOF and address remaining functional goals. (see flow sheet as applicable)     Details if applicable:     15   28349 Therapeutic Activity (timed):  use of dynamic activities replicating functional movements to increase ROM, strength, coordination, balance, and proprioception in order to improve patient's ability to progress to PLOF and address remaining functional goals.   (see flow sheet as applicable)       Details if applicable:            Details if applicable:            Details if applicable:     39  [facility] Totals Reminder: bill using total billable min of TIMED therapeutic procedures (example: do not include dry needle or estim unattended, both untimed codes, in totals to left)  8-22 min = 1 unit; 23-37 min = 2

## 2023-05-24 ENCOUNTER — HOSPITAL ENCOUNTER (OUTPATIENT)
Facility: HOSPITAL | Age: 53
Setting detail: RECURRING SERIES
Discharge: HOME | End: 2023-05-27
Payer: OTHER GOVERNMENT

## 2023-05-24 PROCEDURE — 97535 SELF CARE MNGMENT TRAINING: CPT

## 2023-05-24 PROCEDURE — 97530 THERAPEUTIC ACTIVITIES: CPT

## 2023-05-24 PROCEDURE — 97110 THERAPEUTIC EXERCISES: CPT

## 2023-05-25 NOTE — PROGRESS NOTES
PHYSICAL / OCCUPATIONAL THERAPY - DAILY TREATMENT NOTE (updated )    Patient Name: Sujey Auguste    Date: 2023    : 1970  Insurance: Payor: [employer] EAST / Plan: [employer] EAST / Product Type: *No Product type* /      Patient  verified Yes     Visit #   Current / Total 39 40   Time   In / Out 540 630   Pain   In / Out 1 0   Subjective Functional Status/Changes: Stiff from swimming but I was able to swim after last visit   Changes to:  Meds, Allergies, Med Hx, Sx Hx? If yes, update Summary List yes       TREATMENT AREA =  Left shoulder pain [M25.512]    OBJECTIVE  Therapeutic Procedures: Tx Min Billable or 1:1 Min Procedure, Rationale, Specifics   15  [id number] Therapeutic Exercise (timed):  increase ROM, strength, coordination, balance, and proprioception to improve patient's ability to progress to PLOF and address remaining functional goals. (see flow sheet as applicable)     Details if applicable:       15  07367 Neuromuscular Re-Education (timed):  improve balance, coordination, kinesthetic sense, posture, core stability and proprioception to improve patient's ability to develop conscious control of individual muscles and awareness of position of extremities in order to progress to PLOF and address remaining functional goals. (see flow sheet as applicable)     Details if applicable:     15   10830 Therapeutic Activity (timed):  use of dynamic activities replicating functional movements to increase ROM, strength, coordination, balance, and proprioception in order to improve patient's ability to progress to PLOF and address remaining functional goals.   (see flow sheet as applicable)       Details if applicable:            Details if applicable:            Details if applicable:     39  [facility] Totals Reminder: bill using total billable min of TIMED therapeutic procedures (example: do not include dry needle or estim unattended, both untimed codes, in totals to left)  8-22 min = 1 unit; 23-37 min = 2

## 2024-05-20 ENCOUNTER — OFFICE VISIT (OUTPATIENT)
Facility: CLINIC | Age: 54
End: 2024-05-20

## 2024-05-20 VITALS
OXYGEN SATURATION: 95 % | HEIGHT: 66 IN | BODY MASS INDEX: 36.93 KG/M2 | SYSTOLIC BLOOD PRESSURE: 113 MMHG | TEMPERATURE: 97.7 F | DIASTOLIC BLOOD PRESSURE: 76 MMHG | HEART RATE: 64 BPM | WEIGHT: 229.8 LBS | RESPIRATION RATE: 16 BRPM

## 2024-05-20 DIAGNOSIS — Z12.12 ENCOUNTER FOR COLORECTAL CANCER SCREENING: ICD-10-CM

## 2024-05-20 DIAGNOSIS — Z11.4 ENCOUNTER FOR SCREENING FOR HIV: ICD-10-CM

## 2024-05-20 DIAGNOSIS — Z13.6 ENCOUNTER FOR SCREENING FOR CORONARY ARTERY DISEASE: ICD-10-CM

## 2024-05-20 DIAGNOSIS — Z76.89 ENCOUNTER TO ESTABLISH CARE WITH NEW DOCTOR: Primary | ICD-10-CM

## 2024-05-20 DIAGNOSIS — Z13.1 ENCOUNTER FOR SCREENING FOR DIABETES MELLITUS: ICD-10-CM

## 2024-05-20 DIAGNOSIS — C62.90 SEMINOMA OF TESTIS, UNSPECIFIED LATERALITY (HCC): ICD-10-CM

## 2024-05-20 DIAGNOSIS — F90.0 ATTENTION DEFICIT HYPERACTIVITY DISORDER (ADHD), PREDOMINANTLY INATTENTIVE TYPE: ICD-10-CM

## 2024-05-20 DIAGNOSIS — E66.09 CLASS 2 OBESITY DUE TO EXCESS CALORIES WITHOUT SERIOUS COMORBIDITY WITH BODY MASS INDEX (BMI) OF 37.0 TO 37.9 IN ADULT: ICD-10-CM

## 2024-05-20 DIAGNOSIS — Z11.59 ENCOUNTER FOR HEPATITIS C SCREENING TEST FOR LOW RISK PATIENT: ICD-10-CM

## 2024-05-20 DIAGNOSIS — Z20.828 EXPOSURE TO HERPES SIMPLEX VIRUS (HSV): ICD-10-CM

## 2024-05-20 DIAGNOSIS — F41.9 ANXIETY: ICD-10-CM

## 2024-05-20 DIAGNOSIS — Z13.220 SCREENING CHOLESTEROL LEVEL: ICD-10-CM

## 2024-05-20 DIAGNOSIS — Z12.11 ENCOUNTER FOR COLORECTAL CANCER SCREENING: ICD-10-CM

## 2024-05-20 PROBLEM — E66.812 CLASS 2 OBESITY DUE TO EXCESS CALORIES WITHOUT SERIOUS COMORBIDITY WITH BODY MASS INDEX (BMI) OF 37.0 TO 37.9 IN ADULT: Status: ACTIVE | Noted: 2024-05-20

## 2024-05-20 RX ORDER — DEXTROAMPHETAMINE SACCHARATE, AMPHETAMINE ASPARTATE MONOHYDRATE, DEXTROAMPHETAMINE SULFATE AND AMPHETAMINE SULFATE 5; 5; 5; 5 MG/1; MG/1; MG/1; MG/1
20 CAPSULE, EXTENDED RELEASE ORAL EVERY MORNING
Qty: 90 CAPSULE | Refills: 0 | Status: SHIPPED | OUTPATIENT
Start: 2024-05-20 | End: 2024-08-18

## 2024-05-20 RX ORDER — DEXTROAMPHETAMINE SACCHARATE, AMPHETAMINE ASPARTATE, DEXTROAMPHETAMINE SULFATE AND AMPHETAMINE SULFATE 2.5; 2.5; 2.5; 2.5 MG/1; MG/1; MG/1; MG/1
10 TABLET ORAL DAILY PRN
COMMUNITY
Start: 2023-04-20 | End: 2024-05-20 | Stop reason: SDUPTHER

## 2024-05-20 RX ORDER — DEXTROAMPHETAMINE SACCHARATE, AMPHETAMINE ASPARTATE, DEXTROAMPHETAMINE SULFATE AND AMPHETAMINE SULFATE 2.5; 2.5; 2.5; 2.5 MG/1; MG/1; MG/1; MG/1
10 TABLET ORAL DAILY PRN
Qty: 90 TABLET | Refills: 0 | Status: SHIPPED | OUTPATIENT
Start: 2024-05-20 | End: 2024-08-18

## 2024-05-20 RX ORDER — VALACYCLOVIR HYDROCHLORIDE 1 G/1
1000 TABLET, FILM COATED ORAL 3 TIMES DAILY
Qty: 21 TABLET | Refills: 0 | Status: SHIPPED | OUTPATIENT
Start: 2024-05-20 | End: 2024-05-27

## 2024-05-20 RX ORDER — DEXTROAMPHETAMINE SACCHARATE, AMPHETAMINE ASPARTATE MONOHYDRATE, DEXTROAMPHETAMINE SULFATE AND AMPHETAMINE SULFATE 5; 5; 5; 5 MG/1; MG/1; MG/1; MG/1
20 CAPSULE, EXTENDED RELEASE ORAL EVERY MORNING
COMMUNITY
Start: 2023-02-10 | End: 2024-05-20 | Stop reason: SDUPTHER

## 2024-05-20 SDOH — ECONOMIC STABILITY: FOOD INSECURITY: WITHIN THE PAST 12 MONTHS, THE FOOD YOU BOUGHT JUST DIDN'T LAST AND YOU DIDN'T HAVE MONEY TO GET MORE.: NEVER TRUE

## 2024-05-20 SDOH — ECONOMIC STABILITY: FOOD INSECURITY: WITHIN THE PAST 12 MONTHS, YOU WORRIED THAT YOUR FOOD WOULD RUN OUT BEFORE YOU GOT MONEY TO BUY MORE.: NEVER TRUE

## 2024-05-20 SDOH — ECONOMIC STABILITY: HOUSING INSECURITY
IN THE LAST 12 MONTHS, WAS THERE A TIME WHEN YOU DID NOT HAVE A STEADY PLACE TO SLEEP OR SLEPT IN A SHELTER (INCLUDING NOW)?: NO

## 2024-05-20 SDOH — HEALTH STABILITY: PHYSICAL HEALTH: ON AVERAGE, HOW MANY MINUTES DO YOU ENGAGE IN EXERCISE AT THIS LEVEL?: 40 MIN

## 2024-05-20 SDOH — HEALTH STABILITY: PHYSICAL HEALTH: ON AVERAGE, HOW MANY DAYS PER WEEK DO YOU ENGAGE IN MODERATE TO STRENUOUS EXERCISE (LIKE A BRISK WALK)?: 3 DAYS

## 2024-05-20 SDOH — ECONOMIC STABILITY: INCOME INSECURITY: HOW HARD IS IT FOR YOU TO PAY FOR THE VERY BASICS LIKE FOOD, HOUSING, MEDICAL CARE, AND HEATING?: NOT HARD AT ALL

## 2024-05-20 ASSESSMENT — PATIENT HEALTH QUESTIONNAIRE - PHQ9
SUM OF ALL RESPONSES TO PHQ QUESTIONS 1-9: 0
1. LITTLE INTEREST OR PLEASURE IN DOING THINGS: NOT AT ALL
2. FEELING DOWN, DEPRESSED OR HOPELESS: NOT AT ALL
SUM OF ALL RESPONSES TO PHQ QUESTIONS 1-9: 0
SUM OF ALL RESPONSES TO PHQ9 QUESTIONS 1 & 2: 0
SUM OF ALL RESPONSES TO PHQ QUESTIONS 1-9: 0
SUM OF ALL RESPONSES TO PHQ QUESTIONS 1-9: 0

## 2024-05-20 NOTE — PROGRESS NOTES
Olu Villeda is a 54 y.o. year old male who presents today for No chief complaint on file.      Is someone accompanying this pt? no    Is the patient using any DME equipment during OV? no    Depression Screenin/20/2024     3:18 PM 6/3/2022     9:08 AM   PHQ-9 Questionaire   Little interest or pleasure in doing things 0 0   Feeling down, depressed, or hopeless 0 0   PHQ-9 Total Score 0 0       Abuse Screenin/20/2024     3:00 PM   AMB Abuse Screening   Do you ever feel afraid of your partner? N   Are you in a relationship with someone who physically or mentally threatens you? N   Is it safe for you to go home? Y       Learning Assessment:  Who is the primary learner? Patient    What is the preferred language for health care of the primary learner? ENGLISH    How does the primary learner prefer to learn new concepts? OTHER (COMMENT) all of the above   Answered By patient    Relationship to Learner SELF    Highest level of education completed by primary learner? > 4 YEARS OF COLLEGE    Are there any barriers / factors that could impact learning? NONE        Fall Risk:       No data to display                    Coordination of Care:   1. \"Have you been to the ER, urgent care clinic since your last visit?  Hospitalized since your last visit?\" no    2. \"Have you seen or consulted any other health care providers outside of the [facility] since your last visit?\" no    3. For patients aged 45-75: Has the patient had a colonoscopy / FIT/ Cologuard? yes    If the patient is female:    4. For patients aged 40-74: Has the patient had a mammogram within the past 2 years? N/a    5. For patients aged 21-65: Has the patient had a pap smear? N/a    Health Maintenance: reviewed and discussed and ordered per Provider.    Health Maintenance Due   Topic Date Due    Hepatitis B vaccine (1 of 3 - 3-dose series) Never done    COVID-19 Vaccine (1) Never done    HIV screen  Never done    Hepatitis C screen  
plans.  I have discussed medication side effects and warnings with the patient as well. I have reviewed the plan of care with the patient, accepted their input and they are in agreement with the treatment goals.     Follow-up and Dispositions    Return in about 2 weeks (around 6/3/2024) for lab results.         Marvin Good MD  May 21, 2024

## 2024-05-20 NOTE — PATIENT INSTRUCTIONS
It was nice meeting you today.  Please have your labs done either immediately after this visit, or you can schedule at the  to come back for labs.  Please do your labs at least a week before your next appointment.    If you have questions or concerns, My Chart is the fastest way to get in touch with me and the clinical staff.    Keep in mind that [facility] policy schedules most appointments to last 15 minutes. If you have a new problem or multiple problems you can request 30 minutes.  If you arrive more than [facility] through your scheduled appointment, staff may offer to reschedule you. This is to ensure you and the provider have enough time to complete a high quality encounter.  Please arrive to your appointments at least 10 minutes early to avoid any unforseen delays.     If you have trouble paying medical bills, please consider contacting [employer].  [employer] patient representatives are available to discuss government programs that provide assistance with medical bills to qualifying individuals and their families.  The services are provided free of charge to patients in need of assistance.  [employer] patient representatives can also assist you in completing and registering applications with appropriate agencies.  Please call the following number if you are interested: [phone].

## 2024-05-21 ENCOUNTER — HOSPITAL ENCOUNTER (OUTPATIENT)
Facility: HOSPITAL | Age: 54
Setting detail: SPECIMEN
Discharge: HOME | End: 2024-05-24
Payer: OTHER GOVERNMENT

## 2024-05-21 DIAGNOSIS — Z13.1 ENCOUNTER FOR SCREENING FOR DIABETES MELLITUS: ICD-10-CM

## 2024-05-21 DIAGNOSIS — Z13.220 SCREENING CHOLESTEROL LEVEL: ICD-10-CM

## 2024-05-21 DIAGNOSIS — Z11.4 ENCOUNTER FOR SCREENING FOR HIV: ICD-10-CM

## 2024-05-21 DIAGNOSIS — Z76.89 ENCOUNTER TO ESTABLISH CARE WITH NEW DOCTOR: ICD-10-CM

## 2024-05-21 DIAGNOSIS — Z13.6 ENCOUNTER FOR SCREENING FOR CORONARY ARTERY DISEASE: ICD-10-CM

## 2024-05-21 DIAGNOSIS — Z11.59 ENCOUNTER FOR HEPATITIS C SCREENING TEST FOR LOW RISK PATIENT: ICD-10-CM

## 2024-05-21 PROBLEM — F41.9 ANXIETY: Status: ACTIVE | Noted: 2024-05-21

## 2024-05-21 PROBLEM — C62.90 SEMINOMA OF TESTIS (HCC): Status: ACTIVE | Noted: 2024-05-21

## 2024-05-21 LAB
ALBUMIN SERPL-MCNC: 3.9 G/DL (ref 3.4–5)
ALBUMIN/GLOB SERPL: 1.2 (ref 0.8–1.7)
ALP SERPL-CCNC: 72 U/L (ref 45–117)
ALT SERPL-CCNC: 26 U/L (ref 16–61)
ANION GAP SERPL CALC-SCNC: 2 MMOL/L (ref 3–18)
AST SERPL-CCNC: 13 U/L (ref 10–38)
BILIRUB SERPL-MCNC: 1.7 MG/DL (ref 0.2–1)
BUN SERPL-MCNC: 17 MG/DL (ref 7–18)
BUN/CREAT SERPL: 15 (ref 12–20)
CALCIUM SERPL-MCNC: 9 MG/DL (ref 8.5–10.1)
CHLORIDE SERPL-SCNC: 104 MMOL/L (ref 100–111)
CHOLEST SERPL-MCNC: 211 MG/DL
CO2 SERPL-SCNC: 29 MMOL/L (ref 21–32)
CREAT SERPL-MCNC: 1.13 MG/DL (ref 0.6–1.3)
EST. AVERAGE GLUCOSE BLD GHB EST-MCNC: 100 MG/DL
GLOBULIN SER CALC-MCNC: 3.3 G/DL (ref 2–4)
GLUCOSE SERPL-MCNC: 101 MG/DL (ref 74–99)
HBA1C MFR BLD: 5.1 % (ref 4.2–5.6)
HDLC SERPL-MCNC: 39 MG/DL (ref 40–60)
HDLC SERPL: 5.4 (ref 0–5)
HIV 1+2 AB+HIV1 P24 AG SERPL QL IA: NONREACTIVE
HIV 1/2 RESULT COMMENT: NORMAL
LDLC SERPL CALC-MCNC: 140.6 MG/DL (ref 0–100)
LIPID PANEL: ABNORMAL
POTASSIUM SERPL-SCNC: 4.6 MMOL/L (ref 3.5–5.5)
PROT SERPL-MCNC: 7.2 G/DL (ref 6.4–8.2)
SODIUM SERPL-SCNC: 135 MMOL/L (ref 136–145)
TRIGL SERPL-MCNC: 157 MG/DL
VLDLC SERPL CALC-MCNC: 31.4 MG/DL

## 2024-05-21 PROCEDURE — 86803 HEPATITIS C AB TEST: CPT

## 2024-05-21 PROCEDURE — 87389 HIV-1 AG W/HIV-1&-2 AB AG IA: CPT

## 2024-05-21 PROCEDURE — 83036 HEMOGLOBIN GLYCOSYLATED A1C: CPT

## 2024-05-21 PROCEDURE — 80061 LIPID PANEL: CPT

## 2024-05-21 PROCEDURE — 80053 COMPREHEN METABOLIC PANEL: CPT

## 2024-05-21 PROCEDURE — 36415 COLL VENOUS BLD VENIPUNCTURE: CPT

## 2024-05-22 LAB
HCV AB SERPL QL IA: NORMAL
HCV IGG SERPL QL IA: NON REACTIVE S/CO RATIO

## 2024-06-05 ENCOUNTER — HOSPITAL ENCOUNTER (OUTPATIENT)
Facility: HOSPITAL | Age: 54
Setting detail: SPECIMEN
Discharge: HOME | End: 2024-06-08
Payer: OTHER GOVERNMENT

## 2024-06-05 ENCOUNTER — OFFICE VISIT (OUTPATIENT)
Facility: CLINIC | Age: 54
End: 2024-06-05
Payer: OTHER GOVERNMENT

## 2024-06-05 VITALS
HEART RATE: 66 BPM | SYSTOLIC BLOOD PRESSURE: 106 MMHG | DIASTOLIC BLOOD PRESSURE: 69 MMHG | OXYGEN SATURATION: 96 % | HEIGHT: 66 IN | WEIGHT: 226 LBS | RESPIRATION RATE: 16 BRPM | BODY MASS INDEX: 36.32 KG/M2 | TEMPERATURE: 98 F

## 2024-06-05 DIAGNOSIS — E80.6 HYPERBILIRUBINEMIA: Primary | ICD-10-CM

## 2024-06-05 DIAGNOSIS — E80.6 HYPERBILIRUBINEMIA: ICD-10-CM

## 2024-06-05 DIAGNOSIS — E78.00 HYPERCHOLESTEREMIA: ICD-10-CM

## 2024-06-05 LAB — BILIRUB DIRECT SERPL-MCNC: 0.3 MG/DL (ref 0–0.2)

## 2024-06-05 PROCEDURE — 82248 BILIRUBIN DIRECT: CPT

## 2024-06-05 PROCEDURE — 36415 COLL VENOUS BLD VENIPUNCTURE: CPT

## 2024-06-05 PROCEDURE — G2211 COMPLEX E/M VISIT ADD ON: HCPCS | Performed by: STUDENT IN AN ORGANIZED HEALTH CARE EDUCATION/TRAINING PROGRAM

## 2024-06-05 PROCEDURE — 99213 OFFICE O/P EST LOW 20 MIN: CPT | Performed by: STUDENT IN AN ORGANIZED HEALTH CARE EDUCATION/TRAINING PROGRAM

## 2024-06-05 RX ORDER — VALACYCLOVIR HYDROCHLORIDE 500 MG/1
500 TABLET, FILM COATED ORAL 2 TIMES DAILY
COMMUNITY

## 2024-06-05 ASSESSMENT — PATIENT HEALTH QUESTIONNAIRE - PHQ9
SUM OF ALL RESPONSES TO PHQ QUESTIONS 1-9: 0
1. LITTLE INTEREST OR PLEASURE IN DOING THINGS: NOT AT ALL
SUM OF ALL RESPONSES TO PHQ QUESTIONS 1-9: 0
SUM OF ALL RESPONSES TO PHQ QUESTIONS 1-9: 0
SUM OF ALL RESPONSES TO PHQ9 QUESTIONS 1 & 2: 0
2. FEELING DOWN, DEPRESSED OR HOPELESS: NOT AT ALL
SUM OF ALL RESPONSES TO PHQ QUESTIONS 1-9: 0

## 2024-06-05 NOTE — PROGRESS NOTES
Olu Villeda (:  1970) is a 54 y.o. male here for evaluation of the following chief complaint(s):  Results        ASSESSMENT/PLAN:  1. Hyperbilirubinemia  Assessment & Plan:  - asx  - check direct bili   Orders:  -     Bilirubin, Direct; Future  2. Hypercholesteremia  Assessment & Plan:  - cholesterol diet info provided to pt       Follow-up and Dispositions    Return in about 6 months (around 2024) for check up.         SUBJECTIVE/OBJECTIVE:  HPI  Lab results  - , HDL 39  - tbili 1.7    Hyperbilirubinemia  - denies unusual bleeding, dark urine, ABD pain    ROS as stated above.    /69   Pulse 66   Temp 98 °F (36.7 °C) (Temporal)   Resp 16   Ht 1.676 m (5' 6\")   Wt 102.5 kg (226 lb)   SpO2 96%   BMI 36.48 kg/m²     Physical Exam  Vitals and nursing note reviewed.   Constitutional:       Appearance: He is not ill-appearing.   Cardiovascular:      Rate and Rhythm: Normal rate and regular rhythm.      Pulses: Normal pulses.      Heart sounds: Normal heart sounds.      Comments: - no pedal edema  Pulmonary:      Effort: Pulmonary effort is normal.      Breath sounds: Normal breath sounds.   Neurological:      Mental Status: He is alert and oriented to person, place, and time. Mental status is at baseline.   Psychiatric:         Mood and Affect: Mood normal.               An electronic signature was used to authenticate this note.    --Marvin Good MD

## 2024-06-05 NOTE — PROGRESS NOTES
Olu Villeda is a 54 y.o. year old male who presents today for   Chief Complaint   Patient presents with    Results       Is someone accompanying this pt? no    Is the patient using any DME equipment during OV? no    Depression Screenin/20/2024     3:18 PM 6/3/2022     9:08 AM   PHQ-9 Questionaire   Little interest or pleasure in doing things 0 0   Feeling down, depressed, or hopeless 0 0   PHQ-9 Total Score 0 0       Abuse Screenin/20/2024     3:00 PM   AMB Abuse Screening   Do you ever feel afraid of your partner? N   Are you in a relationship with someone who physically or mentally threatens you? N   Is it safe for you to go home? Y       Learning Assessment:  No question data found.    Fall Risk:       No data to display                    Coordination of Care:   1. \"Have you been to the ER, urgent care clinic since your last visit?  Hospitalized since your last visit?\" no    2. \"Have you seen or consulted any other health care providers outside of the [facility] since your last visit?\" no    3. For patients aged 45-75: Has the patient had a colonoscopy / FIT/ Cologuard? no    If the patient is female:    4. For patients aged 40-74: Has the patient had a mammogram within the past 2 years? NA    5. For patients aged 21-65: Has the patient had a pap smear? NA    Health Maintenance: reviewed and discussed and ordered per Provider.    Health Maintenance Due   Topic Date Due    Hepatitis B vaccine (1 of 3 - 3-dose series) Never done    COVID-19 Vaccine (1) Never done    Shingles vaccine (2 of 2) 2021        - Lara Davis LPN  [facility]  Phone: [phone]  Fax: [phone]

## 2024-10-08 ENCOUNTER — OFFICE VISIT (OUTPATIENT)
Facility: CLINIC | Age: 54
End: 2024-10-08
Payer: OTHER GOVERNMENT

## 2024-10-08 VITALS
TEMPERATURE: 98 F | DIASTOLIC BLOOD PRESSURE: 77 MMHG | OXYGEN SATURATION: 96 % | SYSTOLIC BLOOD PRESSURE: 107 MMHG | HEART RATE: 82 BPM | BODY MASS INDEX: 37.77 KG/M2 | WEIGHT: 235 LBS | HEIGHT: 66 IN

## 2024-10-08 DIAGNOSIS — M51.26 PROTRUSION OF LUMBAR INTERVERTEBRAL DISC: Primary | ICD-10-CM

## 2024-10-08 DIAGNOSIS — F90.0 ATTENTION DEFICIT HYPERACTIVITY DISORDER (ADHD), PREDOMINANTLY INATTENTIVE TYPE: ICD-10-CM

## 2024-10-08 PROCEDURE — 99213 OFFICE O/P EST LOW 20 MIN: CPT

## 2024-10-08 RX ORDER — DEXTROAMPHETAMINE SACCHARATE, AMPHETAMINE ASPARTATE MONOHYDRATE, DEXTROAMPHETAMINE SULFATE AND AMPHETAMINE SULFATE 5; 5; 5; 5 MG/1; MG/1; MG/1; MG/1
20 CAPSULE, EXTENDED RELEASE ORAL EVERY MORNING
Qty: 30 CAPSULE | Refills: 0 | Status: SHIPPED | OUTPATIENT
Start: 2024-10-08 | End: 2024-11-07

## 2024-10-08 RX ORDER — GABAPENTIN 300 MG/1
300 CAPSULE ORAL 2 TIMES DAILY
Qty: 60 CAPSULE | Refills: 0 | Status: SHIPPED | OUTPATIENT
Start: 2024-10-08 | End: 2024-11-07

## 2024-10-08 RX ORDER — DEXTROAMPHETAMINE SACCHARATE, AMPHETAMINE ASPARTATE, DEXTROAMPHETAMINE SULFATE AND AMPHETAMINE SULFATE 2.5; 2.5; 2.5; 2.5 MG/1; MG/1; MG/1; MG/1
10 TABLET ORAL DAILY PRN
Qty: 30 TABLET | Refills: 0 | Status: SHIPPED | OUTPATIENT
Start: 2024-10-08 | End: 2024-11-07

## 2024-10-08 SDOH — ECONOMIC STABILITY: FOOD INSECURITY: WITHIN THE PAST 12 MONTHS, YOU WORRIED THAT YOUR FOOD WOULD RUN OUT BEFORE YOU GOT MONEY TO BUY MORE.: NEVER TRUE

## 2024-10-08 SDOH — ECONOMIC STABILITY: FOOD INSECURITY: WITHIN THE PAST 12 MONTHS, THE FOOD YOU BOUGHT JUST DIDN'T LAST AND YOU DIDN'T HAVE MONEY TO GET MORE.: NEVER TRUE

## 2024-10-08 SDOH — ECONOMIC STABILITY: INCOME INSECURITY: HOW HARD IS IT FOR YOU TO PAY FOR THE VERY BASICS LIKE FOOD, HOUSING, MEDICAL CARE, AND HEATING?: NOT HARD AT ALL

## 2024-10-08 ASSESSMENT — ANXIETY QUESTIONNAIRES
1. FEELING NERVOUS, ANXIOUS, OR ON EDGE: NOT AT ALL
5. BEING SO RESTLESS THAT IT IS HARD TO SIT STILL: NOT AT ALL
4. TROUBLE RELAXING: NOT AT ALL
GAD7 TOTAL SCORE: 0
IF YOU CHECKED OFF ANY PROBLEMS ON THIS QUESTIONNAIRE, HOW DIFFICULT HAVE THESE PROBLEMS MADE IT FOR YOU TO DO YOUR WORK, TAKE CARE OF THINGS AT HOME, OR GET ALONG WITH OTHER PEOPLE: NOT DIFFICULT AT ALL
6. BECOMING EASILY ANNOYED OR IRRITABLE: NOT AT ALL
2. NOT BEING ABLE TO STOP OR CONTROL WORRYING: NOT AT ALL
7. FEELING AFRAID AS IF SOMETHING AWFUL MIGHT HAPPEN: NOT AT ALL
3. WORRYING TOO MUCH ABOUT DIFFERENT THINGS: NOT AT ALL

## 2024-10-08 ASSESSMENT — PATIENT HEALTH QUESTIONNAIRE - PHQ9
1. LITTLE INTEREST OR PLEASURE IN DOING THINGS: NOT AT ALL
2. FEELING DOWN, DEPRESSED OR HOPELESS: NOT AT ALL
SUM OF ALL RESPONSES TO PHQ QUESTIONS 1-9: 0
SUM OF ALL RESPONSES TO PHQ9 QUESTIONS 1 & 2: 0
SUM OF ALL RESPONSES TO PHQ QUESTIONS 1-9: 0

## 2024-10-08 NOTE — PROGRESS NOTES
Olu Villeda is a 54 y.o. year old male who presents today for   Chief Complaint   Patient presents with    Medication Refill        \"Have you been to the ER, urgent care clinic since your last visit?  Hospitalized since your last visit?\"   NO     “Have you seen or consulted any other health care providers outside our system since your last visit?”   NO             - CINDY Gracia  [facility]  Phone: [phone]  Fax: [phone]

## 2024-10-08 NOTE — PROGRESS NOTES
Olu Villeda is a 54 y.o. year old male who presents today for   Chief Complaint   Patient presents with    Medication Refill        \"Have you been to the ER, urgent care clinic since your last visit?  Hospitalized since your last visit?\"   NO     “Have you seen or consulted any other health care providers outside our system since your last visit?”   NO             - CINDY Gracia  [facility]  Phone: [phone]  Fax: [phone]

## 2024-10-29 ASSESSMENT — ENCOUNTER SYMPTOMS
VOICE CHANGE: 0
VOMITING: 0
CONSTIPATION: 0
SORE THROAT: 0
WHEEZING: 0
TROUBLE SWALLOWING: 0
EYES NEGATIVE: 1
SINUS PRESSURE: 0
DIARRHEA: 0
COUGH: 0
SHORTNESS OF BREATH: 0
NAUSEA: 0
BLOOD IN STOOL: 0

## 2024-10-29 NOTE — PROGRESS NOTES
Patient ID: Olu Villeda is a 54 y.o. male established patient presents for the following:      Subjective:     Primary historian: patient    Medication Refill       Medication Refill  Pertinent negatives include no chest pain, coughing, fatigue, fever, headaches, nausea, numbness, sore throat, vomiting or weakness.          Past Medical History:   Diagnosis Date    Chronic back pain 2018    COVID-19 01/2022    Depression Oct 2017    GERD (gastroesophageal reflux disease)     Restless legs syndrome 2016    Sleep apnea     no cpap       Past Surgical History:   Procedure Laterality Date    ANTERIOR CRUCIATE LIGAMENT REPAIR      HERNIA REPAIR      KNEE ARTHROSCOPY Right     TESTICLE REMOVAL  2006    UMBILICAL HERNIA REPAIR  2011    UPPER GASTROINTESTINAL ENDOSCOPY  2017       Current Outpatient Medications   Medication Sig Dispense Refill    amphetamine-dextroamphetamine (ADDERALL XR) 20 MG extended release capsule Take 1 capsule by mouth every morning for 30 days. Max Daily Amount: 20 mg 30 capsule 0    amphetamine-dextroamphetamine (ADDERALL, 10MG,) 10 MG tablet Take 1 tablet by mouth daily as needed (inattention) for up to 30 days. Max Daily Amount: 10 mg 30 tablet 0    gabapentin (NEURONTIN) 300 MG capsule Take 1 capsule by mouth 2 times daily for 30 days. Intended supply: 90 days Max Daily Amount: 600 mg 60 capsule 0    valACYclovir (VALTREX) 500 MG tablet Take 1 tablet by mouth 2 times daily      esomeprazole (NEXIUM) 20 MG delayed release capsule Take 1 capsule by mouth daily      ibuprofen (ADVIL;MOTRIN) 800 MG tablet ceived the following from [facility]: Outside name: ibuprofen (MOTRIN) 800 mg tablet       No current facility-administered medications for this visit.          ROS   Review of Systems   Constitutional:  Negative for appetite change, fatigue, fever and unexpected weight change.   HENT:  Negative for ear pain, postnasal drip, sinus pressure, sore throat, trouble swallowing and

## 2024-12-09 ENCOUNTER — OFFICE VISIT (OUTPATIENT)
Facility: CLINIC | Age: 54
End: 2024-12-09

## 2024-12-09 VITALS
OXYGEN SATURATION: 96 % | TEMPERATURE: 97.2 F | DIASTOLIC BLOOD PRESSURE: 67 MMHG | HEIGHT: 66 IN | HEART RATE: 78 BPM | SYSTOLIC BLOOD PRESSURE: 108 MMHG | BODY MASS INDEX: 37.57 KG/M2 | RESPIRATION RATE: 14 BRPM | WEIGHT: 233.8 LBS

## 2024-12-09 DIAGNOSIS — Z23 ENCOUNTER FOR IMMUNIZATION: ICD-10-CM

## 2024-12-09 DIAGNOSIS — F90.0 ATTENTION DEFICIT HYPERACTIVITY DISORDER (ADHD), PREDOMINANTLY INATTENTIVE TYPE: ICD-10-CM

## 2024-12-09 DIAGNOSIS — K21.9 GASTROESOPHAGEAL REFLUX DISEASE WITHOUT ESOPHAGITIS: ICD-10-CM

## 2024-12-09 DIAGNOSIS — E78.00 HYPERCHOLESTEREMIA: ICD-10-CM

## 2024-12-09 DIAGNOSIS — E80.6 HYPERBILIRUBINEMIA: ICD-10-CM

## 2024-12-09 DIAGNOSIS — Z23 FLU VACCINE NEED: Primary | ICD-10-CM

## 2024-12-09 RX ORDER — DEXTROAMPHETAMINE SACCHARATE, AMPHETAMINE ASPARTATE, DEXTROAMPHETAMINE SULFATE AND AMPHETAMINE SULFATE 2.5; 2.5; 2.5; 2.5 MG/1; MG/1; MG/1; MG/1
10 TABLET ORAL DAILY PRN
Qty: 30 TABLET | Refills: 0 | Status: SHIPPED | OUTPATIENT
Start: 2024-12-09 | End: 2025-01-08

## 2024-12-09 RX ORDER — DEXTROAMPHETAMINE SACCHARATE, AMPHETAMINE ASPARTATE MONOHYDRATE, DEXTROAMPHETAMINE SULFATE AND AMPHETAMINE SULFATE 5; 5; 5; 5 MG/1; MG/1; MG/1; MG/1
20 CAPSULE, EXTENDED RELEASE ORAL EVERY MORNING
Qty: 30 CAPSULE | Refills: 0 | Status: SHIPPED | OUTPATIENT
Start: 2024-12-09 | End: 2025-01-08

## 2024-12-09 NOTE — PROGRESS NOTES
Olu Villeda (:  1970) is a 54 y.o. male here for evaluation of the following chief complaint(s):  Follow-up        ASSESSMENT/PLAN:  1. Flu vaccine need  2. Gastroesophageal reflux disease without esophagitis  -     esomeprazole (NEXIUM) 20 MG delayed release capsule; Take 1 capsule by mouth daily, Disp-90 capsule, R-3Normal  3. Attention deficit hyperactivity disorder (ADHD), predominantly inattentive type  Assessment & Plan:  - effective and no adverse SE  -  reviewed and appropriate   Orders:  -     amphetamine-dextroamphetamine (ADDERALL XR) 20 MG extended release capsule; Take 1 capsule by mouth every morning for 30 days. Max Daily Amount: 20 mg, Disp-30 capsule, R-0Normal  -     amphetamine-dextroamphetamine (ADDERALL, 10MG,) 10 MG tablet; Take 1 tablet by mouth daily as needed (inattention) for up to 30 days. Max Daily Amount: 10 mg, Disp-30 tablet, R-0Normal  4. Hyperbilirubinemia  -     Comprehensive Metabolic Panel; Future  5. Hypercholesteremia  -     Lipid Panel; Future  6. Encounter for immunization  -     Influenza, FLUCELVAX Trivalent, (age 6 mo+) IM, Preservative Free, 0.5mL      Follow-up and Dispositions    Return in about 3 months (around 3/9/2025) for lab talk.         SUBJECTIVE/OBJECTIVE:  HPI  ADHD  - formally dx'd   - adderall helped greatly in med school  Update (12/10/24)  - Adderall is effective and has no adverse SE    ROS as stated above.    /67 (Site: Right Upper Arm, Position: Sitting, Cuff Size: Large Adult)   Pulse 78   Temp 97.2 °F (36.2 °C) (Temporal)   Resp 14   Ht 1.676 m (5' 6\")   Wt 106.1 kg (233 lb 12.8 oz)   SpO2 96%   BMI 37.74 kg/m²     Physical Exam          An electronic signature was used to authenticate this note.    --Marvin Good MD

## 2024-12-09 NOTE — PROGRESS NOTES
Olu Villeda is a 54 y.o. year old male who presents today for   Chief Complaint   Patient presents with    Follow-up        \"Have you been to the ER, urgent care clinic since your last visit?  Hospitalized since your last visit?\"   no     “Have you seen or consulted any other health care providers outside our system since your last visit?”   NO             Emily Gastelum [facility]  Ph: [phone]  Fax: [phone]

## 2024-12-19 ENCOUNTER — COMMUNITY OUTREACH (OUTPATIENT)
Facility: CLINIC | Age: 54
End: 2024-12-19

## 2024-12-19 NOTE — PROGRESS NOTES
Patient's HM shows they are overdue for Colorectal Screening.   Care Everywhere and  files searched.   updated with 2024 colonoscopy.

## 2025-01-08 ENCOUNTER — OFFICE VISIT (OUTPATIENT)
Facility: CLINIC | Age: 55
End: 2025-01-08
Payer: OTHER GOVERNMENT

## 2025-01-08 VITALS
BODY MASS INDEX: 38.6 KG/M2 | OXYGEN SATURATION: 96 % | WEIGHT: 240.2 LBS | HEIGHT: 66 IN | HEART RATE: 72 BPM | RESPIRATION RATE: 14 BRPM | SYSTOLIC BLOOD PRESSURE: 129 MMHG | DIASTOLIC BLOOD PRESSURE: 70 MMHG | TEMPERATURE: 98 F

## 2025-01-08 DIAGNOSIS — J22 LOWER RESPIRATORY INFECTION: ICD-10-CM

## 2025-01-08 DIAGNOSIS — J06.9 UPPER RESPIRATORY TRACT INFECTION, UNSPECIFIED TYPE: Primary | ICD-10-CM

## 2025-01-08 DIAGNOSIS — R05.3 CHRONIC COUGH: ICD-10-CM

## 2025-01-08 DIAGNOSIS — J06.9 VIRAL UPPER RESPIRATORY TRACT INFECTION: ICD-10-CM

## 2025-01-08 LAB
EXP DATE SOLUTION: NORMAL
EXP DATE SWAB: NORMAL
EXPIRATION DATE: NORMAL
GROUP A STREP ANTIGEN, POC: NEGATIVE
LOT NUMBER POC: NORMAL
LOT NUMBER SOLUTION: NORMAL
LOT NUMBER SWAB: NORMAL
QUICKVUE INFLUENZA TEST: NEGATIVE
SARS-COV-2 RNA, POC: NEGATIVE
VALID INTERNAL CONTROL, POC: YES
VALID INTERNAL CONTROL, POC: YES

## 2025-01-08 PROCEDURE — 99214 OFFICE O/P EST MOD 30 MIN: CPT | Performed by: STUDENT IN AN ORGANIZED HEALTH CARE EDUCATION/TRAINING PROGRAM

## 2025-01-08 PROCEDURE — G2211 COMPLEX E/M VISIT ADD ON: HCPCS | Performed by: STUDENT IN AN ORGANIZED HEALTH CARE EDUCATION/TRAINING PROGRAM

## 2025-01-08 PROCEDURE — 87804 INFLUENZA ASSAY W/OPTIC: CPT | Performed by: STUDENT IN AN ORGANIZED HEALTH CARE EDUCATION/TRAINING PROGRAM

## 2025-01-08 PROCEDURE — 87635 SARS-COV-2 COVID-19 AMP PRB: CPT | Performed by: STUDENT IN AN ORGANIZED HEALTH CARE EDUCATION/TRAINING PROGRAM

## 2025-01-08 PROCEDURE — 87880 STREP A ASSAY W/OPTIC: CPT | Performed by: STUDENT IN AN ORGANIZED HEALTH CARE EDUCATION/TRAINING PROGRAM

## 2025-01-08 RX ORDER — ACETAMINOPHEN 500 MG
500 TABLET ORAL EVERY 6 HOURS PRN
Qty: 120 TABLET | Refills: 1 | Status: SHIPPED | OUTPATIENT
Start: 2025-01-08

## 2025-01-08 RX ORDER — AZITHROMYCIN 500 MG/1
500 TABLET, FILM COATED ORAL DAILY
Qty: 3 TABLET | Refills: 0 | Status: SHIPPED | OUTPATIENT
Start: 2025-01-08 | End: 2025-01-11

## 2025-01-08 RX ORDER — CODEINE PHOSPHATE AND GUAIFENESIN 10; 100 MG/5ML; MG/5ML
5 SOLUTION ORAL 3 TIMES DAILY PRN
Qty: 118 ML | Refills: 0 | Status: SHIPPED | OUTPATIENT
Start: 2025-01-08 | End: 2025-01-16

## 2025-01-08 RX ORDER — ALBUTEROL SULFATE 90 UG/1
2 INHALANT RESPIRATORY (INHALATION) 4 TIMES DAILY PRN
Qty: 18 G | Refills: 0 | Status: SHIPPED | OUTPATIENT
Start: 2025-01-08

## 2025-01-08 SDOH — ECONOMIC STABILITY: FOOD INSECURITY: WITHIN THE PAST 12 MONTHS, THE FOOD YOU BOUGHT JUST DIDN'T LAST AND YOU DIDN'T HAVE MONEY TO GET MORE.: NEVER TRUE

## 2025-01-08 SDOH — ECONOMIC STABILITY: FOOD INSECURITY: WITHIN THE PAST 12 MONTHS, YOU WORRIED THAT YOUR FOOD WOULD RUN OUT BEFORE YOU GOT MONEY TO BUY MORE.: NEVER TRUE

## 2025-01-08 ASSESSMENT — PATIENT HEALTH QUESTIONNAIRE - PHQ9
2. FEELING DOWN, DEPRESSED OR HOPELESS: NOT AT ALL
SUM OF ALL RESPONSES TO PHQ QUESTIONS 1-9: 0
SUM OF ALL RESPONSES TO PHQ QUESTIONS 1-9: 0
1. LITTLE INTEREST OR PLEASURE IN DOING THINGS: NOT AT ALL
SUM OF ALL RESPONSES TO PHQ9 QUESTIONS 1 & 2: 0
SUM OF ALL RESPONSES TO PHQ QUESTIONS 1-9: 0
SUM OF ALL RESPONSES TO PHQ QUESTIONS 1-9: 0

## 2025-01-08 NOTE — PROGRESS NOTES
Olu Villeda (:  1970) is a 54 y.o. male here for evaluation of the following chief complaint(s):  Congestion and Cough          ASSESSMENT/PLAN:  1. Upper respiratory tract infection, unspecified type  -     AMB POC RAPID STREP A  -     AMB POC COVID-19 COV  -     AMB POC RAPID INFLUENZA TEST  2. Viral upper respiratory tract infection  -     pseudoephedrine (SUDAFED) 60 MG tablet; Take 1 tablet by mouth every 6 hours as needed for Congestion, Disp-50 tablet, R-1Normal  -     acetaminophen (TYLENOL) 500 MG tablet; Take 1 tablet by mouth every 6 hours as needed for Pain or Fever, Disp-120 tablet, R-1Normal  -     guaiFENesin-codeine (GUAIFENESIN AC) 100-10 MG/5ML liquid; Take 5 mLs by mouth 3 times daily as needed for Cough for up to 8 days. Max Daily Amount: 15 mLs, Disp-118 mL, R-0Normal  -     dextromethorphan-guaiFENesin  MG/5ML LIQD syrup; Take 5 mLs by mouth every 4 hours as needed (cough or chest congestion), Disp-473.18 mL, R-1Normal  3. Lower respiratory infection  -     azithromycin (ZITHROMAX) 500 MG tablet; Take 1 tablet by mouth daily for 3 days, Disp-3 tablet, R-0Normal  4. Chronic cough  -     azithromycin (ZITHROMAX) 500 MG tablet; Take 1 tablet by mouth daily for 3 days, Disp-3 tablet, R-0Normal  -     albuterol sulfate HFA (VENTOLIN HFA) 108 (90 Base) MCG/ACT inhaler; Inhale 2 puffs into the lungs 4 times daily as needed for Wheezing, Disp-18 g, R-0Normal    - possible mycoplasma PNA, empiric azithromycin and symptomatic tx  - pt to notify if cough persists for another 2-3 weeks; if so will order CXR      SUBJECTIVE/OBJECTIVE:  HPI  URI sxs  -  persistent productive cough, subjective fever, x 4 weeks  - has exercise induced asthma  - no sick contacts  - vaccines: UTD on flu, UTD COVID  - home COVID test negative x 2  - taking mucinex with sudafed, hard to tell if it helped  - advair in the past helped for a lingering cough    ROS as stated above.    /70 (Site: Left Upper Arm,

## 2025-01-08 NOTE — PROGRESS NOTES
Olu Villeda is a 54 y.o. year old male who presents today for   Chief Complaint   Patient presents with    Congestion    Cough        \"Have you been to the ER, urgent care clinic since your last visit?  Hospitalized since your last visit?\"   no      “Have you seen or consulted any other health care providers outside our system since your last visit?”   NO             Emily Gastelum CMA  [facility]  Ph: [phone]  Fax: [phone]

## 2025-03-19 DIAGNOSIS — Z12.5 PROSTATE CANCER SCREENING: ICD-10-CM

## 2025-03-19 DIAGNOSIS — F90.0 ATTENTION DEFICIT HYPERACTIVITY DISORDER (ADHD), PREDOMINANTLY INATTENTIVE TYPE: ICD-10-CM

## 2025-03-19 DIAGNOSIS — Z13.220 SCREENING FOR HYPERCHOLESTEROLEMIA: Primary | ICD-10-CM

## 2025-03-19 DIAGNOSIS — Z13.1 SCREENING FOR DIABETES MELLITUS: ICD-10-CM

## 2025-03-19 DIAGNOSIS — E66.812 CLASS 2 OBESITY DUE TO EXCESS CALORIES WITHOUT SERIOUS COMORBIDITY WITH BODY MASS INDEX (BMI) OF 37.0 TO 37.9 IN ADULT: ICD-10-CM

## 2025-03-19 DIAGNOSIS — E78.00 HYPERCHOLESTEREMIA: ICD-10-CM

## 2025-03-19 DIAGNOSIS — E80.6 HYPERBILIRUBINEMIA: ICD-10-CM

## 2025-03-19 DIAGNOSIS — E66.09 CLASS 2 OBESITY DUE TO EXCESS CALORIES WITHOUT SERIOUS COMORBIDITY WITH BODY MASS INDEX (BMI) OF 37.0 TO 37.9 IN ADULT: ICD-10-CM

## 2025-03-19 RX ORDER — DEXTROAMPHETAMINE SACCHARATE, AMPHETAMINE ASPARTATE MONOHYDRATE, DEXTROAMPHETAMINE SULFATE AND AMPHETAMINE SULFATE 5; 5; 5; 5 MG/1; MG/1; MG/1; MG/1
20 CAPSULE, EXTENDED RELEASE ORAL EVERY MORNING
Qty: 30 CAPSULE | Refills: 0 | Status: SHIPPED | OUTPATIENT
Start: 2025-03-19 | End: 2025-04-18

## 2025-03-19 RX ORDER — DEXTROAMPHETAMINE SACCHARATE, AMPHETAMINE ASPARTATE, DEXTROAMPHETAMINE SULFATE AND AMPHETAMINE SULFATE 2.5; 2.5; 2.5; 2.5 MG/1; MG/1; MG/1; MG/1
10 TABLET ORAL DAILY PRN
Qty: 30 TABLET | Refills: 0 | Status: SHIPPED | OUTPATIENT
Start: 2025-03-19 | End: 2025-04-18

## 2025-03-19 NOTE — TELEPHONE ENCOUNTER
Medication(s) requesting:   Requested Prescriptions     Pending Prescriptions Disp Refills    amphetamine-dextroamphetamine (ADDERALL XR) 20 MG extended release capsule 30 capsule 0     Sig: Take 1 capsule by mouth every morning for 30 days. Max Daily Amount: 20 mg    amphetamine-dextroamphetamine (ADDERALL, 10MG,) 10 MG tablet 30 tablet 0     Sig: Take 1 tablet by mouth daily as needed (inattention) for up to 30 days. Max Daily Amount: 10 mg        Last office visit:  1/8/25  Next office visit DMA: Visit date not found

## 2025-03-24 ENCOUNTER — TELEPHONE (OUTPATIENT)
Facility: CLINIC | Age: 55
End: 2025-03-24

## 2025-03-24 NOTE — TELEPHONE ENCOUNTER
Reached out to pt for a follow up   If they do return call this is for a lab and follow up appt        Thank you

## 2025-05-13 DIAGNOSIS — F90.0 ATTENTION DEFICIT HYPERACTIVITY DISORDER (ADHD), PREDOMINANTLY INATTENTIVE TYPE: ICD-10-CM

## 2025-05-13 RX ORDER — DEXTROAMPHETAMINE SACCHARATE, AMPHETAMINE ASPARTATE MONOHYDRATE, DEXTROAMPHETAMINE SULFATE AND AMPHETAMINE SULFATE 5; 5; 5; 5 MG/1; MG/1; MG/1; MG/1
20 CAPSULE, EXTENDED RELEASE ORAL EVERY MORNING
Qty: 30 CAPSULE | Refills: 0 | OUTPATIENT
Start: 2025-05-13 | End: 2025-06-12

## 2025-05-13 RX ORDER — DEXTROAMPHETAMINE SACCHARATE, AMPHETAMINE ASPARTATE, DEXTROAMPHETAMINE SULFATE AND AMPHETAMINE SULFATE 2.5; 2.5; 2.5; 2.5 MG/1; MG/1; MG/1; MG/1
10 TABLET ORAL DAILY PRN
Qty: 30 TABLET | Refills: 0 | OUTPATIENT
Start: 2025-05-13 | End: 2025-06-12

## 2025-05-13 NOTE — TELEPHONE ENCOUNTER
Medication(s) requesting:   Requested Prescriptions     Pending Prescriptions Disp Refills    amphetamine-dextroamphetamine (ADDERALL XR) 20 MG extended release capsule 30 capsule 0     Sig: Take 1 capsule by mouth every morning for 30 days. Max Daily Amount: 20 mg    amphetamine-dextroamphetamine (ADDERALL, 10MG,) 10 MG tablet 30 tablet 0     Sig: Take 1 tablet by mouth daily as needed (inattention) for up to 30 days. Max Daily Amount: 10 mg        Last office visit:  1/08/25  Next office visit DMA: Visit date not found

## 2025-05-15 ENCOUNTER — TELEPHONE (OUTPATIENT)
Facility: CLINIC | Age: 55
End: 2025-05-15

## 2025-05-20 ENCOUNTER — OFFICE VISIT (OUTPATIENT)
Facility: CLINIC | Age: 55
End: 2025-05-20
Payer: OTHER GOVERNMENT

## 2025-05-20 VITALS
SYSTOLIC BLOOD PRESSURE: 128 MMHG | TEMPERATURE: 97.4 F | HEART RATE: 85 BPM | WEIGHT: 251.8 LBS | BODY MASS INDEX: 40.47 KG/M2 | HEIGHT: 66 IN | DIASTOLIC BLOOD PRESSURE: 83 MMHG | RESPIRATION RATE: 16 BRPM | OXYGEN SATURATION: 94 %

## 2025-05-20 DIAGNOSIS — Z13.1 SCREENING FOR DIABETES MELLITUS: ICD-10-CM

## 2025-05-20 DIAGNOSIS — F41.9 ANXIETY: ICD-10-CM

## 2025-05-20 DIAGNOSIS — R05.3 CHRONIC COUGH: ICD-10-CM

## 2025-05-20 DIAGNOSIS — E66.812 CLASS 2 OBESITY DUE TO EXCESS CALORIES WITHOUT SERIOUS COMORBIDITY WITH BODY MASS INDEX (BMI) OF 37.0 TO 37.9 IN ADULT: ICD-10-CM

## 2025-05-20 DIAGNOSIS — E78.00 HYPERCHOLESTEREMIA: ICD-10-CM

## 2025-05-20 DIAGNOSIS — J45.40 MODERATE PERSISTENT ASTHMA WITHOUT COMPLICATION: Primary | ICD-10-CM

## 2025-05-20 DIAGNOSIS — E80.6 HYPERBILIRUBINEMIA: ICD-10-CM

## 2025-05-20 DIAGNOSIS — E66.09 CLASS 2 OBESITY DUE TO EXCESS CALORIES WITHOUT SERIOUS COMORBIDITY WITH BODY MASS INDEX (BMI) OF 37.0 TO 37.9 IN ADULT: ICD-10-CM

## 2025-05-20 DIAGNOSIS — F90.0 ATTENTION DEFICIT HYPERACTIVITY DISORDER (ADHD), PREDOMINANTLY INATTENTIVE TYPE: ICD-10-CM

## 2025-05-20 PROCEDURE — 96127 BRIEF EMOTIONAL/BEHAV ASSMT: CPT | Performed by: STUDENT IN AN ORGANIZED HEALTH CARE EDUCATION/TRAINING PROGRAM

## 2025-05-20 PROCEDURE — 99214 OFFICE O/P EST MOD 30 MIN: CPT | Performed by: STUDENT IN AN ORGANIZED HEALTH CARE EDUCATION/TRAINING PROGRAM

## 2025-05-20 PROCEDURE — G2211 COMPLEX E/M VISIT ADD ON: HCPCS | Performed by: STUDENT IN AN ORGANIZED HEALTH CARE EDUCATION/TRAINING PROGRAM

## 2025-05-20 RX ORDER — FLUTICASONE PROPIONATE AND SALMETEROL 100; 50 UG/1; UG/1
1 POWDER RESPIRATORY (INHALATION) EVERY 12 HOURS
Qty: 60 EACH | Refills: 5 | Status: SHIPPED | OUTPATIENT
Start: 2025-05-20

## 2025-05-20 RX ORDER — ALBUTEROL SULFATE 90 UG/1
2 INHALANT RESPIRATORY (INHALATION) 4 TIMES DAILY PRN
Qty: 18 G | Refills: 5 | Status: SHIPPED | OUTPATIENT
Start: 2025-05-20

## 2025-05-20 RX ORDER — DEXTROAMPHETAMINE SACCHARATE, AMPHETAMINE ASPARTATE, DEXTROAMPHETAMINE SULFATE AND AMPHETAMINE SULFATE 2.5; 2.5; 2.5; 2.5 MG/1; MG/1; MG/1; MG/1
10 TABLET ORAL DAILY PRN
Qty: 90 TABLET | Refills: 0 | Status: SHIPPED | OUTPATIENT
Start: 2025-05-20 | End: 2025-08-18

## 2025-05-20 RX ORDER — DEXTROAMPHETAMINE SACCHARATE, AMPHETAMINE ASPARTATE MONOHYDRATE, DEXTROAMPHETAMINE SULFATE AND AMPHETAMINE SULFATE 5; 5; 5; 5 MG/1; MG/1; MG/1; MG/1
20 CAPSULE, EXTENDED RELEASE ORAL EVERY MORNING
Qty: 90 CAPSULE | Refills: 0 | Status: SHIPPED | OUTPATIENT
Start: 2025-05-20 | End: 2025-08-18

## 2025-05-20 ASSESSMENT — ENCOUNTER SYMPTOMS: COUGH: 1

## 2025-05-20 NOTE — PROGRESS NOTES
Olu Villeda is a 55 y.o. year old male who presents today for   Chief Complaint   Patient presents with    Cough    Discuss Medications    Other     Referral for Pulmonology     ADHD    Medication Refill        \"Have you been to the ER, urgent care clinic since your last visit?  Hospitalized since your last visit?\"   NO     “Have you seen or consulted any other health care providers outside our system since your last visit?”   NO           Emily Gastelum [facility]  Ph: [phone]  Fax: [phone]

## 2025-05-20 NOTE — PROGRESS NOTES
Olu Villeda (:  1970) is a 55 y.o. male here for evaluation of the following chief complaint(s):  Cough, Discuss Medications, Other (Referral for Pulmonology ), ADHD, and Medication Refill        ASSESSMENT/PLAN:  1. Moderate persistent asthma without complication  Assessment & Plan:  - likely undertreated  - restart advair  - establish with pulm   Orders:  -     fluticasone-salmeterol (ADVAIR) 100-50 MCG/ACT AEPB diskus inhaler; Inhale 1 puff into the lungs in the morning and 1 puff in the evening., Disp-60 each, R-5Normal  -     [facility]  2. Chronic cough  Assessment & Plan:  - likely undertreated asthma  - restart advair  - establish with pulm    Orders:  -     albuterol sulfate HFA (VENTOLIN HFA) 108 (90 Base) MCG/ACT inhaler; Inhale 2 puffs into the lungs 4 times daily as needed for Wheezing, Disp-18 g, R-5Normal  3. Attention deficit hyperactivity disorder (ADHD), predominantly inattentive type  Assessment & Plan:  - effective and no adverse SE  -  reviewed and appropriate       Orders:  -     amphetamine-dextroamphetamine (ADDERALL XR) 20 MG extended release capsule; Take 1 capsule by mouth every morning for 90 days. Max Daily Amount: 20 mg, Disp-90 capsule, R-0Normal  -     amphetamine-dextroamphetamine (ADDERALL, 10MG,) 10 MG tablet; Take 1 tablet by mouth daily as needed (inattention) for up to 90 days. Max Daily Amount: 10 mg, Disp-90 tablet, R-0Normal  -     CBC; Future  4. Screening for diabetes mellitus  -     Hemoglobin A1C; Future  5. Hypercholesteremia  -     Lipid Panel; Future  6. Hyperbilirubinemia  -     Comprehensive Metabolic Panel; Future  -     CBC; Future  7. Class 2 obesity due to excess calories without serious comorbidity with body mass index (BMI) of 37.0 to 37.9 in adult  -     Comprehensive Metabolic Panel; Future  -     CBC; Future  8. Anxiety  Assessment & Plan:  - doing ok  - work stress has reduced  - RAS score 4

## 2025-07-07 ENCOUNTER — HOSPITAL ENCOUNTER (OUTPATIENT)
Facility: HOSPITAL | Age: 55
Setting detail: SPECIMEN
Discharge: HOME | End: 2025-07-10
Payer: OTHER GOVERNMENT

## 2025-07-07 DIAGNOSIS — E78.00 HYPERCHOLESTEREMIA: ICD-10-CM

## 2025-07-07 DIAGNOSIS — E80.6 HYPERBILIRUBINEMIA: ICD-10-CM

## 2025-07-07 DIAGNOSIS — Z12.5 PROSTATE CANCER SCREENING: ICD-10-CM

## 2025-07-07 DIAGNOSIS — E66.09 CLASS 2 OBESITY DUE TO EXCESS CALORIES WITHOUT SERIOUS COMORBIDITY WITH BODY MASS INDEX (BMI) OF 37.0 TO 37.9 IN ADULT: ICD-10-CM

## 2025-07-07 DIAGNOSIS — F90.0 ATTENTION DEFICIT HYPERACTIVITY DISORDER (ADHD), PREDOMINANTLY INATTENTIVE TYPE: ICD-10-CM

## 2025-07-07 DIAGNOSIS — E66.812 CLASS 2 OBESITY DUE TO EXCESS CALORIES WITHOUT SERIOUS COMORBIDITY WITH BODY MASS INDEX (BMI) OF 37.0 TO 37.9 IN ADULT: ICD-10-CM

## 2025-07-07 DIAGNOSIS — Z13.1 SCREENING FOR DIABETES MELLITUS: ICD-10-CM

## 2025-07-07 LAB
ALBUMIN SERPL-MCNC: 4.1 G/DL (ref 3.4–5)
ALBUMIN/GLOB SERPL: 1.2 (ref 0.8–1.7)
ALP SERPL-CCNC: 79 U/L (ref 45–117)
ALT SERPL-CCNC: 27 U/L (ref 10–50)
AMPHET UR QL SCN: POSITIVE
ANION GAP SERPL CALC-SCNC: 14 MMOL/L (ref 3–18)
AST SERPL-CCNC: 23 U/L (ref 10–38)
BARBITURATES UR QL SCN: NEGATIVE
BENZODIAZ UR QL: NEGATIVE
BILIRUB DIRECT SERPL-MCNC: 0.3 MG/DL (ref 0–0.2)
BILIRUB SERPL-MCNC: 1.1 MG/DL (ref 0.2–1)
BUN SERPL-MCNC: 15 MG/DL (ref 6–23)
BUN/CREAT SERPL: 11 (ref 12–20)
CALCIUM SERPL-MCNC: 9.5 MG/DL (ref 8.5–10.1)
CANNABINOIDS UR QL SCN: NEGATIVE
CHLORIDE SERPL-SCNC: 101 MMOL/L (ref 98–107)
CHOLEST SERPL-MCNC: 207 MG/DL
CO2 SERPL-SCNC: 22 MMOL/L (ref 21–32)
COCAINE UR QL SCN: NEGATIVE
CREAT SERPL-MCNC: 1.37 MG/DL (ref 0.6–1.3)
ERYTHROCYTE [DISTWIDTH] IN BLOOD BY AUTOMATED COUNT: 12.8 % (ref 11.6–14.5)
EST. AVERAGE GLUCOSE BLD GHB EST-MCNC: 110 MG/DL
FENTANYL: NEGATIVE
GLOBULIN SER CALC-MCNC: 3.3 G/DL (ref 2–4)
GLUCOSE SERPL-MCNC: 86 MG/DL (ref 74–108)
HBA1C MFR BLD: 5.5 % (ref 4.2–5.6)
HCT VFR BLD AUTO: 46.5 % (ref 36–48)
HDLC SERPL-MCNC: 32 MG/DL (ref 40–60)
HDLC SERPL: 6.4 (ref 0–5)
HGB BLD-MCNC: 15.3 G/DL (ref 13–16)
LDLC SERPL CALC-MCNC: 115 MG/DL (ref 0–100)
Lab: ABNORMAL
MCH RBC QN AUTO: 30.2 PG (ref 24–34)
MCHC RBC AUTO-ENTMCNC: 32.9 G/DL (ref 31–37)
MCV RBC AUTO: 91.7 FL (ref 78–100)
METHADONE UR QL: NEGATIVE
NRBC # BLD: 0 K/UL (ref 0–0.01)
NRBC BLD-RTO: 0 PER 100 WBC
OPIATES UR QL: NEGATIVE
OXYCODONE UR QL SCN: NEGATIVE
PLATELET # BLD AUTO: 269 K/UL (ref 135–420)
PMV BLD AUTO: 11.9 FL (ref 9.2–11.8)
POTASSIUM SERPL-SCNC: 4.5 MMOL/L (ref 3.5–5.5)
PROT SERPL-MCNC: 7.4 G/DL (ref 6.4–8.2)
PSA SERPL-MCNC: 0.4 NG/ML (ref 1.4–4.4)
RBC # BLD AUTO: 5.07 M/UL (ref 4.35–5.65)
SODIUM SERPL-SCNC: 137 MMOL/L (ref 136–145)
TRIGL SERPL-MCNC: 298 MG/DL (ref 0–150)
VLDLC SERPL CALC-MCNC: 60 MG/DL
WBC # BLD AUTO: 10.4 K/UL (ref 4.6–13.2)

## 2025-07-07 PROCEDURE — 80061 LIPID PANEL: CPT

## 2025-07-07 PROCEDURE — 80307 DRUG TEST PRSMV CHEM ANLYZR: CPT

## 2025-07-07 PROCEDURE — 83036 HEMOGLOBIN GLYCOSYLATED A1C: CPT

## 2025-07-07 PROCEDURE — 82248 BILIRUBIN DIRECT: CPT

## 2025-07-07 PROCEDURE — G0103 PSA SCREENING: HCPCS

## 2025-07-07 PROCEDURE — 80053 COMPREHEN METABOLIC PANEL: CPT

## 2025-07-07 PROCEDURE — 82977 ASSAY OF GGT: CPT

## 2025-07-07 PROCEDURE — 85027 COMPLETE CBC AUTOMATED: CPT

## 2025-07-07 PROCEDURE — 36415 COLL VENOUS BLD VENIPUNCTURE: CPT

## 2025-07-08 LAB — GGT SERPL-CCNC: 39 U/L (ref 15–85)

## 2025-07-15 ENCOUNTER — TELEMEDICINE (OUTPATIENT)
Facility: CLINIC | Age: 55
End: 2025-07-15
Payer: OTHER GOVERNMENT

## 2025-07-15 DIAGNOSIS — E78.00 HYPERCHOLESTEREMIA: Primary | ICD-10-CM

## 2025-07-15 DIAGNOSIS — E80.6 HYPERBILIRUBINEMIA: ICD-10-CM

## 2025-07-15 DIAGNOSIS — F90.0 ATTENTION DEFICIT HYPERACTIVITY DISORDER (ADHD), PREDOMINANTLY INATTENTIVE TYPE: ICD-10-CM

## 2025-07-15 PROCEDURE — 99214 OFFICE O/P EST MOD 30 MIN: CPT | Performed by: STUDENT IN AN ORGANIZED HEALTH CARE EDUCATION/TRAINING PROGRAM

## 2025-07-15 PROCEDURE — G2211 COMPLEX E/M VISIT ADD ON: HCPCS | Performed by: STUDENT IN AN ORGANIZED HEALTH CARE EDUCATION/TRAINING PROGRAM

## 2025-07-15 SDOH — ECONOMIC STABILITY: FOOD INSECURITY: WITHIN THE PAST 12 MONTHS, THE FOOD YOU BOUGHT JUST DIDN'T LAST AND YOU DIDN'T HAVE MONEY TO GET MORE.: NEVER TRUE

## 2025-07-15 SDOH — ECONOMIC STABILITY: FOOD INSECURITY: WITHIN THE PAST 12 MONTHS, YOU WORRIED THAT YOUR FOOD WOULD RUN OUT BEFORE YOU GOT MONEY TO BUY MORE.: NEVER TRUE

## 2025-07-15 ASSESSMENT — PATIENT HEALTH QUESTIONNAIRE - PHQ9
SUM OF ALL RESPONSES TO PHQ QUESTIONS 1-9: 0
SUM OF ALL RESPONSES TO PHQ QUESTIONS 1-9: 0
2. FEELING DOWN, DEPRESSED OR HOPELESS: NOT AT ALL
SUM OF ALL RESPONSES TO PHQ QUESTIONS 1-9: 0
SUM OF ALL RESPONSES TO PHQ QUESTIONS 1-9: 0
1. LITTLE INTEREST OR PLEASURE IN DOING THINGS: NOT AT ALL

## 2025-07-15 NOTE — PROGRESS NOTES
Olu Villeda is a 55 y.o. year old male who presents today for   Chief Complaint   Patient presents with    Discuss Labs       \"Have you been to the ER, urgent care clinic since your last visit?  Hospitalized since your last visit?\"    No    “Have you seen or consulted any other health care providers outside our system since your last visit?”    No        Click Here for Release of Records Request    Brady Reddy [address] [address] [phone]

## 2025-07-15 NOTE — PROGRESS NOTES
Olu Villeda, was evaluated through a synchronous (real-time) audio-video encounter. The patient (or guardian if applicable) is aware that this is a billable service, which includes applicable co-pays. This Virtual Visit was conducted with patient's (and/or legal guardian's) consent. Patient identification was verified, and a caregiver was present when appropriate.   The patient was located at Home: [address] [place] [id number]  Provider was located at Facility (Appt Dept): [address] [facility], [address]  [place] [address]  Confirm you are appropriately licensed, registered, or certified to deliver care in the state where the patient is located as indicated above. If you are not or unsure, please re-schedule the visit: Yes, I confirm.     Olu Villeda (:  1970) is a Established patient, presenting virtually for evaluation of the following:      Below is the assessment and plan developed based on review of pertinent history, physical exam, labs, studies, and medications.     Assessment & Plan  Hypercholesteremia     - low risk  - pt has already improved diet and increased exercise       Hyperbilirubinemia     - asx; continue to mointor       Attention deficit hyperactivity disorder (ADHD), predominantly inattentive type     - controlled on adderall xr 20, but he only needs it during the work week         No follow-ups on file.       Subjective   HPI  Review of Systems     Hyperbilirubinemia  - unconjugated bili reduced a little    HLD  - > 115, HDL 39> 32, trig 157> 298  - has improved diet    ANNA?  - Cr 1.37, GFR WNL    ADHD  - confirmed adderall in UDS    Cough paroxysm  - hx asthma, dx'd by pulm; advair controlled it  - with post tussive emesis   Update (07/15/25)  - great improvement with advair    Objective   Patient-Reported Vitals  No data recorded     Physical Exam  Constitutional:       Appearance: Normal appearance.   Pulmonary:      Effort: Pulmonary effort is normal.